# Patient Record
Sex: MALE | Race: WHITE | NOT HISPANIC OR LATINO | Employment: FULL TIME
[De-identification: names, ages, dates, MRNs, and addresses within clinical notes are randomized per-mention and may not be internally consistent; named-entity substitution may affect disease eponyms.]

---

## 2017-11-04 ENCOUNTER — CHARTING TRANS (OUTPATIENT)
Dept: OTHER | Age: 27
End: 2017-11-04

## 2018-05-27 ENCOUNTER — LAB SERVICES (OUTPATIENT)
Dept: OTHER | Age: 28
End: 2018-05-27

## 2018-05-27 ENCOUNTER — CHARTING TRANS (OUTPATIENT)
Dept: OTHER | Age: 28
End: 2018-05-27

## 2018-05-27 LAB — RAPID STREP GROUP A: POSITIVE

## 2018-11-01 VITALS
WEIGHT: 311.52 LBS | OXYGEN SATURATION: 96 % | SYSTOLIC BLOOD PRESSURE: 120 MMHG | HEART RATE: 80 BPM | RESPIRATION RATE: 20 BRPM | TEMPERATURE: 98.7 F | DIASTOLIC BLOOD PRESSURE: 70 MMHG

## 2018-11-02 VITALS — HEIGHT: 74 IN | WEIGHT: 300 LBS | HEART RATE: 74 BPM | TEMPERATURE: 98.3 F | BODY MASS INDEX: 38.5 KG/M2

## 2019-11-13 PROCEDURE — 88342 IMHCHEM/IMCYTCHM 1ST ANTB: CPT | Performed by: SPECIALIST

## 2019-11-13 PROCEDURE — 88305 TISSUE EXAM BY PATHOLOGIST: CPT | Performed by: SPECIALIST

## 2022-04-23 ENCOUNTER — WALK IN (OUTPATIENT)
Dept: URGENT CARE | Age: 32
End: 2022-04-23

## 2022-04-23 VITALS
HEART RATE: 80 BPM | TEMPERATURE: 98.4 F | SYSTOLIC BLOOD PRESSURE: 120 MMHG | OXYGEN SATURATION: 95 % | DIASTOLIC BLOOD PRESSURE: 80 MMHG | RESPIRATION RATE: 16 BRPM

## 2022-04-23 DIAGNOSIS — J02.9 PHARYNGITIS, UNSPECIFIED ETIOLOGY: ICD-10-CM

## 2022-04-23 DIAGNOSIS — B97.89 VIRAL SINUSITIS: ICD-10-CM

## 2022-04-23 DIAGNOSIS — J32.9 VIRAL SINUSITIS: ICD-10-CM

## 2022-04-23 DIAGNOSIS — H66.001 NON-RECURRENT ACUTE SUPPURATIVE OTITIS MEDIA OF RIGHT EAR WITHOUT SPONTANEOUS RUPTURE OF TYMPANIC MEMBRANE: Primary | ICD-10-CM

## 2022-04-23 LAB
INTERNAL PROCEDURAL CONTROLS ACCEPTABLE: YES
S PYO AG THROAT QL IA.RAPID: NEGATIVE
TEST LOT EXPIRATION DATE: NORMAL
TEST LOT NUMBER: NORMAL

## 2022-04-23 PROCEDURE — 87880 STREP A ASSAY W/OPTIC: CPT | Performed by: NURSE PRACTITIONER

## 2022-04-23 PROCEDURE — 99202 OFFICE O/P NEW SF 15 MIN: CPT | Performed by: NURSE PRACTITIONER

## 2022-04-23 RX ORDER — AMOXICILLIN AND CLAVULANATE POTASSIUM 875; 125 MG/1; MG/1
1 TABLET, FILM COATED ORAL EVERY 12 HOURS
Qty: 20 TABLET | Refills: 0 | Status: SHIPPED | OUTPATIENT
Start: 2022-04-23

## 2022-04-23 ASSESSMENT — ENCOUNTER SYMPTOMS
SHORTNESS OF BREATH: 1
SORE THROAT: 1
FATIGUE: 0
CHEST TIGHTNESS: 0
DIARRHEA: 0
WHEEZING: 0
SINUS PRESSURE: 1
CHILLS: 0
HEADACHES: 0
COUGH: 1
FEVER: 0
ABDOMINAL PAIN: 0
NAUSEA: 0
VOMITING: 0
RHINORRHEA: 0

## 2022-04-23 ASSESSMENT — PAIN SCALES - GENERAL: PAINLEVEL: 6

## 2023-08-01 ENCOUNTER — HOSPITAL ENCOUNTER (OUTPATIENT)
Age: 33
Discharge: HOME | End: 2023-08-01
Payer: COMMERCIAL

## 2023-08-01 VITALS
TEMPERATURE: 98 F | SYSTOLIC BLOOD PRESSURE: 135 MMHG | RESPIRATION RATE: 16 BRPM | HEART RATE: 81 BPM | OXYGEN SATURATION: 98 % | DIASTOLIC BLOOD PRESSURE: 82 MMHG

## 2023-08-01 DIAGNOSIS — H61.23 BILATERAL IMPACTED CERUMEN: Primary | ICD-10-CM

## 2023-08-01 PROCEDURE — 69209 REMOVE IMPACTED EAR WAX UNI: CPT

## 2023-08-01 PROCEDURE — 99202 OFFICE O/P NEW SF 15 MIN: CPT

## 2023-08-01 NOTE — DISCHARGE INSTRUCTIONS
I would avoid swimming, submerging head in water, tub baths, getting water in ears etc.  May take Tylenol or Motrin. Please avoid frequent use of Q-tips. Do not stick or pour anything in ear for the next 2 to 3 days.   If you feel cerumen impaction occurring again, you may return to immediate care for removal. [FreeTextEntry1] : Impression: breast exam- benign\par back pain

## 2023-08-01 NOTE — ED INITIAL ASSESSMENT (HPI)
Patient arrives ambulatory with c/o unable to hear out of both ears since the weekend. States he thinks it is wax buildup and normally goes to the ENT, but is unable to get in.

## 2024-12-31 ENCOUNTER — HOSPITAL ENCOUNTER (EMERGENCY)
Facility: HOSPITAL | Age: 34
Discharge: HOME | End: 2024-12-31
Attending: STUDENT IN AN ORGANIZED HEALTH CARE EDUCATION/TRAINING PROGRAM
Payer: COMMERCIAL

## 2024-12-31 VITALS
HEART RATE: 82 BPM | OXYGEN SATURATION: 100 % | DIASTOLIC BLOOD PRESSURE: 91 MMHG | SYSTOLIC BLOOD PRESSURE: 145 MMHG | RESPIRATION RATE: 18 BRPM | TEMPERATURE: 98 F

## 2024-12-31 DIAGNOSIS — M54.32 SCIATICA OF LEFT SIDE: Primary | ICD-10-CM

## 2024-12-31 PROCEDURE — 96372 THER/PROPH/DIAG INJ SC/IM: CPT

## 2024-12-31 PROCEDURE — 99283 EMERGENCY DEPT VISIT LOW MDM: CPT

## 2024-12-31 PROCEDURE — 99284 EMERGENCY DEPT VISIT MOD MDM: CPT

## 2024-12-31 RX ORDER — DIAZEPAM 2 MG/1
2 TABLET ORAL ONCE
Status: COMPLETED | OUTPATIENT
Start: 2024-12-31 | End: 2024-12-31

## 2024-12-31 RX ORDER — LIDOCAINE 50 MG/G
1 PATCH TOPICAL EVERY 24 HOURS
Qty: 7 PATCH | Refills: 0 | Status: SHIPPED | OUTPATIENT
Start: 2024-12-31 | End: 2025-01-10

## 2024-12-31 RX ORDER — KETOROLAC TROMETHAMINE 15 MG/ML
15 INJECTION, SOLUTION INTRAMUSCULAR; INTRAVENOUS ONCE
Status: COMPLETED | OUTPATIENT
Start: 2024-12-31 | End: 2024-12-31

## 2024-12-31 RX ORDER — KETOROLAC TROMETHAMINE 10 MG/1
10 TABLET, FILM COATED ORAL EVERY 6 HOURS PRN
Qty: 10 TABLET | Refills: 0 | Status: SHIPPED | OUTPATIENT
Start: 2024-12-31 | End: 2025-01-07 | Stop reason: CLARIF

## 2024-12-31 RX ORDER — DIAZEPAM 2 MG/1
2 TABLET ORAL 3 TIMES DAILY PRN
Qty: 20 TABLET | Refills: 0 | Status: SHIPPED | OUTPATIENT
Start: 2024-12-31 | End: 2025-01-07 | Stop reason: CLARIF

## 2024-12-31 NOTE — DISCHARGE INSTRUCTIONS
Thank you for seeking care at [facility] Emergency Department.  You have been seen and evaluated for back pain.      We discussed the results of your workup   Please read the instructions provided   If given prescriptions, take as instructed    Remember, your care process does not end after your visit today. Please follow-up with your doctor within 1-2 days for a follow-up check to ensure you are  improving, to see if you need any further evaluation/testing, or to evaluate for any alternate diagnoses.    Please return to the emergency department if you develop severe pain that is not controlled by pain medications, loss of control of your legs, if you are unable to walk because of pain or weakness, worsening numbness or weakness, loss of bowel or bladder control (dribbling of urine or having accidents you wouldn't normally have), inability to urinate, numbness of your genital or anal area, fevers, abdominal pain, change in urination, or as these could all be signs of a serious medical emergency. Call or return to the ER for any other new or worsening symptoms.

## 2024-12-31 NOTE — ED INITIAL ASSESSMENT (HPI)
Patient to ed via private vehicle co of lower back pain, hx of herniated discs, stated had cortisone shot x 12/19 but now pain worsening

## 2025-01-01 NOTE — ED PROVIDER NOTES
[facility] Emergency Department Note  Patient: Oneil Wallace Age: 34 year old Sex: male      MRN: [id number]  : 3/4/1990    Patient Seen in: [facility] Emergency Department    History     Chief Complaint   Patient presents with    Back Pain     Stated Complaint: Back pain    History obtained from: Patient    Patient is a 34-year-old male with a past medical history of chronic low back pain secondary to known herniated disc presenting today for evaluation of worsening of his chronic low back pain.  He states that he underwent cortisone injections on  but states that since then, he has been having worsening symptoms.  He describes pain in his left buttock that radiates down the left lower extremity.  Denies any associate numbness or tingling.  Denies any known falls, trauma or injury.  Denies any bladder or bowel incontinence.  States that he sees his pain specialist in 2 days.    Review of Systems:  Review of Systems  Positive for stated complaint: Back pain. Constitutional and vital signs reviewed. All other systems reviewed and negative except as noted above.    Patient History:  Past Medical History:    GERD (gastroesophageal reflux disease)    OTHER DISEASES    NONE       Past Surgical History:   Procedure Laterality Date    Other surgical history      L-knee surgery-Arthroscopic        Family History   Problem Relation Age of Onset    Other (Other) Other        Specific Social Determinants of Health:   Social History     Socioeconomic History    Marital status:    Tobacco Use    Smoking status: Every Day     Types: Cigarettes    Smokeless tobacco: Never    Tobacco comments:     1 pack a week, maybe less   Vaping Use    Vaping status: Never Used   Substance and Sexual Activity    Alcohol use: Yes     Comment: socially    Drug use: Yes     Frequency: 7.0 times per week     Types: Cannabis           PSFH elements reviewed from today and agreed except as otherwise stated in HPI.    Physical  Exam     ED Triage Vitals   BP 12/31/24 1139 (!) 145/91   Pulse 12/31/24 1138 82   Resp 12/31/24 1138 18   Temp 12/31/24 1138 98.4 °F (36.9 °C)   Temp src --    SpO2 12/31/24 1138 100 %   O2 Device 12/31/24 1138 None (Room air)       Current:BP (!) 145/91   Pulse 82   Temp 98.4 °F (36.9 °C)   Resp 18   SpO2 100%         Physical Exam  Constitutional:       Appearance: He is well-developed.   HENT:      Head: Normocephalic and atraumatic.      Right Ear: External ear normal.      Left Ear: External ear normal.      Nose: Nose normal.   Eyes:      Conjunctiva/sclera: Conjunctivae normal.      Pupils: Pupils are equal, round, and reactive to light.   Cardiovascular:      Rate and Rhythm: Normal rate and regular rhythm.      Heart sounds: Normal heart sounds.   Pulmonary:      Effort: Pulmonary effort is normal.      Breath sounds: Normal breath sounds.   Abdominal:      General: Bowel sounds are normal.      Palpations: Abdomen is soft.      Tenderness: There is no abdominal tenderness.   Musculoskeletal:         General: Normal range of motion.      Cervical back: Normal range of motion and neck supple.      Comments: No midline back tenderness.  No reproducible tenderness over the lower back or buttock region bilaterally   Skin:     General: Skin is warm and dry.      Findings: No rash.   Neurological:      General: No focal deficit present.      Mental Status: He is alert and oriented to person, place, and time.      Deep Tendon Reflexes: Reflexes are normal and symmetric.   Psychiatric:         Mood and Affect: Mood normal.         Behavior: Behavior normal.         ED Course   Labs:   Labs Reviewed - No data to display  Radiology findings:  I personally reviewed the images.   No results found.        MDM   34-year-old male with a past medical history of chronic low back pain secondary to herniated disc presenting for evaluation of left low back pain radiating to left lower extremity.  Upon arrival emergency  department, he is well-appearing and in no acute distress.  Hemodynamically stable.  No reproducible midline neck or back tenderness.  No fevers or other infectious etiologies.  No urinary symptoms.  Normal distal perfusion.  Normal active and passive range of motion.  Patient has follow-up with his pain specialist in 2 days.    Differential diagnoses considered includes, but is not limited to: Sciatica, herniated disc, chronic low back pain, meralgia paresthetica    Will obtain the following tests: None  Please see ED course for my independent review of these tests/imaging results.    Initial Medications/Therapeutics administered: Toradol, Valium, lidocaine patch    Chronic conditions affecting care: Chronic low back pain, herniated disc    Workup and medications considered but not ordered: No indication for imaging given no midline tenderness.  No new falls, trauma or injury.  No associated neurologic deficits.      Social Determinants of Health that impacted care: None     Following analgesic medications, no significant improvement of pain.  Ambulating without difficulty.  Stable for discharge home at this time with continued analgesic support until patient is able to follow-up with pain specialist in 2 days.  Return precautions were provided and all questions answered.  Patient expressed understanding and agreement with plan.      Disposition and Plan     Clinical Impression:  1. Sciatica of left side        Disposition:  Discharge    Follow-up:  Sulma Nuñez MD  [address]  [address]  [place] [address] [phone]    Schedule an appointment as soon as possible for a visit in 2 day(s)  If symptoms worsen, As needed    Jeovany Lauren,   [address]  [address]  [place] [address] [phone]    Go in 2 day(s)        Medications Prescribed:  Discharge Medication List as of 12/31/2024  3:15 PM        START taking these medications    Details   diazePAM 2 MG Oral Tab Take 1 tablet (2 mg  total) by mouth 3 (three) times daily as needed for Anxiety., Normal, Disp-20 tablet, R-0      lidocaine 5 % External Patch Place 1 patch onto the skin daily for 7 days., Normal, Disp-7 patch, R-0      Ketorolac Tromethamine 10 MG Oral Tab Take 1 tablet (10 mg total) by mouth every 6 (six) hours as needed., Normal, Disp-10 tablet, R-0               This note may have been created using voice dictation technology and may include inadvertent errors.      Kelly Ramirez MD  Emergency Medicine

## 2025-01-07 ENCOUNTER — HOSPITAL ENCOUNTER (INPATIENT)
Facility: HOSPITAL | Age: 35
LOS: 1 days | Discharge: HOME | End: 2025-01-10
Attending: EMERGENCY MEDICINE | Admitting: INTERNAL MEDICINE
Payer: COMMERCIAL

## 2025-01-07 ENCOUNTER — APPOINTMENT (OUTPATIENT)
Dept: MRI IMAGING | Facility: HOSPITAL | Age: 35
End: 2025-01-07
Attending: EMERGENCY MEDICINE
Payer: COMMERCIAL

## 2025-01-07 DIAGNOSIS — M54.16 LUMBAR RADICULOPATHY: ICD-10-CM

## 2025-01-07 DIAGNOSIS — G89.29 ACUTE ON CHRONIC LOW BACK PAIN: Primary | ICD-10-CM

## 2025-01-07 DIAGNOSIS — M54.50 ACUTE ON CHRONIC LOW BACK PAIN: Primary | ICD-10-CM

## 2025-01-07 LAB
ANION GAP SERPL CALC-SCNC: 6 MMOL/L (ref 0–18)
BASOPHILS # BLD AUTO: 0.05 X10(3) UL (ref 0–0.2)
BASOPHILS NFR BLD AUTO: 0.7 %
BILIRUB UR QL: NEGATIVE
BUN BLD-MCNC: 15 MG/DL (ref 9–23)
BUN/CREAT SERPL: 16.3 (ref 10–20)
CALCIUM BLD-MCNC: 10.2 MG/DL (ref 8.7–10.4)
CHLORIDE SERPL-SCNC: 105 MMOL/L (ref 98–112)
CK SERPL-CCNC: 156 U/L
CLARITY UR: CLEAR
CO2 SERPL-SCNC: 26 MMOL/L (ref 21–32)
CREAT BLD-MCNC: 0.92 MG/DL
DEPRECATED RDW RBC AUTO: 38.6 FL (ref 35.1–46.3)
EGFRCR SERPLBLD CKD-EPI 2021: 111 ML/MIN/1.73M2 (ref 60–?)
EOSINOPHIL # BLD AUTO: 0.01 X10(3) UL (ref 0–0.7)
EOSINOPHIL NFR BLD AUTO: 0.1 %
ERYTHROCYTE [DISTWIDTH] IN BLOOD BY AUTOMATED COUNT: 12 % (ref 11–15)
GLUCOSE BLD-MCNC: 108 MG/DL (ref 70–99)
GLUCOSE UR-MCNC: NORMAL MG/DL
HCT VFR BLD AUTO: 46.8 %
HGB BLD-MCNC: 16.9 G/DL
HGB UR QL STRIP.AUTO: NEGATIVE
IMM GRANULOCYTES # BLD AUTO: 0.05 X10(3) UL (ref 0–1)
IMM GRANULOCYTES NFR BLD: 0.7 %
KETONES UR-MCNC: NEGATIVE MG/DL
LEUKOCYTE ESTERASE UR QL STRIP.AUTO: NEGATIVE
LYMPHOCYTES # BLD AUTO: 1.59 X10(3) UL (ref 1–4)
LYMPHOCYTES NFR BLD AUTO: 20.8 %
MCH RBC QN AUTO: 31.5 PG (ref 26–34)
MCHC RBC AUTO-ENTMCNC: 36.1 G/DL (ref 31–37)
MCV RBC AUTO: 87.2 FL
MONOCYTES # BLD AUTO: 0.41 X10(3) UL (ref 0.1–1)
MONOCYTES NFR BLD AUTO: 5.4 %
NEUTROPHILS # BLD AUTO: 5.55 X10 (3) UL (ref 1.5–7.7)
NEUTROPHILS # BLD AUTO: 5.55 X10(3) UL (ref 1.5–7.7)
NEUTROPHILS NFR BLD AUTO: 72.3 %
NITRITE UR QL STRIP.AUTO: NEGATIVE
OSMOLALITY SERPL CALC.SUM OF ELEC: 285 MOSM/KG (ref 275–295)
PH UR: 7.5 [PH] (ref 5–8)
PLATELET # BLD AUTO: 274 10(3)UL (ref 150–450)
POTASSIUM SERPL-SCNC: 4.3 MMOL/L (ref 3.5–5.1)
PROT UR-MCNC: NEGATIVE MG/DL
RBC # BLD AUTO: 5.37 X10(6)UL
SODIUM SERPL-SCNC: 137 MMOL/L (ref 136–145)
SP GR UR STRIP: 1.01 (ref 1–1.03)
UROBILINOGEN UR STRIP-ACNC: NORMAL
WBC # BLD AUTO: 7.7 X10(3) UL (ref 4–11)

## 2025-01-07 PROCEDURE — 96372 THER/PROPH/DIAG INJ SC/IM: CPT

## 2025-01-07 PROCEDURE — 72148 MRI LUMBAR SPINE W/O DYE: CPT | Performed by: EMERGENCY MEDICINE

## 2025-01-07 PROCEDURE — 82550 ASSAY OF CK (CPK): CPT | Performed by: EMERGENCY MEDICINE

## 2025-01-07 PROCEDURE — 81003 URINALYSIS AUTO W/O SCOPE: CPT | Performed by: EMERGENCY MEDICINE

## 2025-01-07 PROCEDURE — 36415 COLL VENOUS BLD VENIPUNCTURE: CPT

## 2025-01-07 PROCEDURE — 85025 COMPLETE CBC W/AUTO DIFF WBC: CPT | Performed by: EMERGENCY MEDICINE

## 2025-01-07 PROCEDURE — 99285 EMERGENCY DEPT VISIT HI MDM: CPT

## 2025-01-07 PROCEDURE — 80048 BASIC METABOLIC PNL TOTAL CA: CPT | Performed by: EMERGENCY MEDICINE

## 2025-01-07 RX ORDER — MORPHINE SULFATE 4 MG/ML
4 INJECTION, SOLUTION INTRAMUSCULAR; INTRAVENOUS EVERY 2 HOUR PRN
Status: DISCONTINUED | OUTPATIENT
Start: 2025-01-07 | End: 2025-01-10

## 2025-01-07 RX ORDER — KETOROLAC TROMETHAMINE 30 MG/ML
60 INJECTION, SOLUTION INTRAMUSCULAR; INTRAVENOUS ONCE
Status: COMPLETED | OUTPATIENT
Start: 2025-01-07 | End: 2025-01-07

## 2025-01-07 RX ORDER — CYCLOBENZAPRINE HCL 10 MG
10 TABLET ORAL 3 TIMES DAILY PRN
Status: DISCONTINUED | OUTPATIENT
Start: 2025-01-07 | End: 2025-01-10

## 2025-01-07 RX ORDER — BUPROPION HYDROCHLORIDE 150 MG/1
150 TABLET, EXTENDED RELEASE ORAL 2 TIMES DAILY
Status: DISCONTINUED | OUTPATIENT
Start: 2025-01-07 | End: 2025-01-10

## 2025-01-07 RX ORDER — MORPHINE SULFATE 2 MG/ML
1 INJECTION, SOLUTION INTRAMUSCULAR; INTRAVENOUS EVERY 2 HOUR PRN
Status: DISCONTINUED | OUTPATIENT
Start: 2025-01-07 | End: 2025-01-10

## 2025-01-07 RX ORDER — BISACODYL 10 MG
10 SUPPOSITORY, RECTAL RECTAL
Status: DISCONTINUED | OUTPATIENT
Start: 2025-01-07 | End: 2025-01-10

## 2025-01-07 RX ORDER — POLYETHYLENE GLYCOL 3350 17 G/17G
17 POWDER, FOR SOLUTION ORAL DAILY PRN
Status: DISCONTINUED | OUTPATIENT
Start: 2025-01-07 | End: 2025-01-10

## 2025-01-07 RX ORDER — PREDNISONE 20 MG/1
20 TABLET ORAL AS DIRECTED
COMMUNITY
Start: 2025-01-02 | End: 2025-01-10

## 2025-01-07 RX ORDER — ACETAMINOPHEN 325 MG/1
650 TABLET ORAL EVERY 4 HOURS PRN
Status: DISCONTINUED | OUTPATIENT
Start: 2025-01-07 | End: 2025-01-10

## 2025-01-07 RX ORDER — HYDROCODONE BITARTRATE AND ACETAMINOPHEN 10; 325 MG/1; MG/1
1 TABLET ORAL EVERY 4 HOURS PRN
Status: DISCONTINUED | OUTPATIENT
Start: 2025-01-07 | End: 2025-01-07

## 2025-01-07 RX ORDER — BUPROPION HYDROCHLORIDE 150 MG/1
150 TABLET, EXTENDED RELEASE ORAL 2 TIMES DAILY
COMMUNITY
Start: 2024-09-09

## 2025-01-07 RX ORDER — HYDROCODONE BITARTRATE AND ACETAMINOPHEN 5; 325 MG/1; MG/1
2 TABLET ORAL EVERY 6 HOURS PRN
COMMUNITY
Start: 2025-01-02 | End: 2025-01-10

## 2025-01-07 RX ORDER — HYDROCODONE BITARTRATE AND ACETAMINOPHEN 10; 325 MG/1; MG/1
1 TABLET ORAL ONCE
Status: COMPLETED | OUTPATIENT
Start: 2025-01-07 | End: 2025-01-07

## 2025-01-07 RX ORDER — GABAPENTIN 100 MG/1
100 CAPSULE ORAL 3 TIMES DAILY
Status: DISCONTINUED | OUTPATIENT
Start: 2025-01-07 | End: 2025-01-07

## 2025-01-07 RX ORDER — HYDROCODONE BITARTRATE AND ACETAMINOPHEN 5; 325 MG/1; MG/1
1 TABLET ORAL EVERY 4 HOURS PRN
Status: DISCONTINUED | OUTPATIENT
Start: 2025-01-07 | End: 2025-01-10

## 2025-01-07 RX ORDER — PROCHLORPERAZINE EDISYLATE 5 MG/ML
5 INJECTION INTRAMUSCULAR; INTRAVENOUS EVERY 8 HOURS PRN
Status: DISCONTINUED | OUTPATIENT
Start: 2025-01-07 | End: 2025-01-10

## 2025-01-07 RX ORDER — MORPHINE SULFATE 2 MG/ML
2 INJECTION, SOLUTION INTRAMUSCULAR; INTRAVENOUS EVERY 2 HOUR PRN
Status: DISCONTINUED | OUTPATIENT
Start: 2025-01-07 | End: 2025-01-10

## 2025-01-07 RX ORDER — HYDROCODONE BITARTRATE AND ACETAMINOPHEN 5; 325 MG/1; MG/1
2 TABLET ORAL EVERY 4 HOURS PRN
Status: DISCONTINUED | OUTPATIENT
Start: 2025-01-07 | End: 2025-01-10

## 2025-01-07 RX ORDER — CYCLOBENZAPRINE HCL 10 MG
10 TABLET ORAL
Status: ON HOLD | COMMUNITY
End: 2025-01-10

## 2025-01-07 RX ORDER — DEXAMETHASONE SODIUM PHOSPHATE 10 MG/ML
10 INJECTION, SOLUTION INTRAMUSCULAR; INTRAVENOUS ONCE
Status: COMPLETED | OUTPATIENT
Start: 2025-01-07 | End: 2025-01-07

## 2025-01-07 RX ORDER — ONDANSETRON 2 MG/ML
4 INJECTION INTRAMUSCULAR; INTRAVENOUS EVERY 6 HOURS PRN
Status: DISCONTINUED | OUTPATIENT
Start: 2025-01-07 | End: 2025-01-10

## 2025-01-07 RX ORDER — GABAPENTIN 300 MG/1
300 CAPSULE ORAL 3 TIMES DAILY
COMMUNITY
Start: 2024-12-30 | End: 2025-01-29

## 2025-01-07 RX ORDER — GABAPENTIN 300 MG/1
300 CAPSULE ORAL 3 TIMES DAILY
Status: DISCONTINUED | OUTPATIENT
Start: 2025-01-07 | End: 2025-01-08

## 2025-01-07 RX ORDER — SODIUM PHOSPHATE, DIBASIC AND SODIUM PHOSPHATE, MONOBASIC 7; 19 G/230ML; G/230ML
1 ENEMA RECTAL ONCE AS NEEDED
Status: DISCONTINUED | OUTPATIENT
Start: 2025-01-07 | End: 2025-01-10

## 2025-01-07 RX ORDER — SENNOSIDES 8.6 MG
17.2 TABLET ORAL NIGHTLY PRN
Status: DISCONTINUED | OUTPATIENT
Start: 2025-01-07 | End: 2025-01-10

## 2025-01-07 RX ORDER — ORPHENADRINE CITRATE 30 MG/ML
60 INJECTION INTRAMUSCULAR; INTRAVENOUS ONCE
Status: COMPLETED | OUTPATIENT
Start: 2025-01-07 | End: 2025-01-07

## 2025-01-07 NOTE — ED QUICK NOTES
Orders for admission, patient is aware of plan and ready to go upstairs. Any questions, please call ED RN JURATE at extension [phone].     Patient Covid vaccination status: Fully vaccinated     COVID Test Ordered in ED: None    COVID Suspicion at Admission: N/A    Running Infusions:  None    Mental Status/LOC at time of transport: AXOX4    Other pertinent information:   CIWA score: N/A   NIH score:  N/A

## 2025-01-07 NOTE — PLAN OF CARE
Monitoring vital signs- stable at this time. No acute changes noted throughout shift. Tolerating diet. Voiding . SCDs for DVT prophylaxis. Pain medication provided as needed. Encouraged frequent ambulation and use of incentive spirometer. Fall precautions maintained- bed alarm on, bed locked in lowest position, call light and personal belongings within reach, non-skid socks in place to bilateral feet. Frequent rounding by nursing staff. Plan to discharge home once medically cleared.      Problem: Patient Centered Care  Goal: Patient preferences are identified and integrated in the patient's plan of care  Description: Interventions:  - What would you like us to know as we care for you?   - Provide timely, complete, and accurate information to patient/family  - Incorporate patient and family knowledge, values, beliefs, and cultural backgrounds into the planning and delivery of care  - Encourage patient/family to participate in care and decision-making at the level they choose  - Honor patient and family perspectives and choices  Outcome: Progressing     Problem: Patient/Family Goals  Goal: Patient/Family Long Term Goal  Description: Patient's Long Term Goal:     Interventions:  - See additional Care Plan goals for specific interventions  Outcome: Progressing  Goal: Patient/Family Short Term Goal  Description: Patient's Short Term Goal:     Interventions:   - See additional Care Plan goals for specific interventions  Outcome: Progressing     Problem: PAIN - ADULT  Goal: Verbalizes/displays adequate comfort level or patient's stated pain goal  Description: INTERVENTIONS:  - Encourage pt to monitor pain and request assistance  - Assess pain using appropriate pain scale  - Administer analgesics based on type and severity of pain and evaluate response  - Implement non-pharmacological measures as appropriate and evaluate response  - Consider cultural and social influences on pain and pain management  - Manage/alleviate  anxiety  - Utilize distraction and/or relaxation techniques  - Monitor for opioid side effects  - Notify MD/LIP if interventions unsuccessful or patient reports new pain  - Anticipate increased pain with activity and pre-medicate as appropriate  Outcome: Progressing     Problem: RISK FOR INFECTION - ADULT  Goal: Absence of fever/infection during anticipated neutropenic period  Description: INTERVENTIONS  - Monitor WBC  - Administer growth factors as ordered  - Implement neutropenic guidelines  Outcome: Progressing     Problem: SAFETY ADULT - FALL  Goal: Free from fall injury  Description: INTERVENTIONS:  - Assess pt frequently for physical needs  - Identify cognitive and physical deficits and behaviors that affect risk of falls.  - [place] fall precautions as indicated by assessment.  - Educate pt/family on patient safety including physical limitations  - Instruct pt to call for assistance with activity based on assessment  - Modify environment to reduce risk of injury  - Provide assistive devices as appropriate  - Consider OT/PT consult to assist with strengthening/mobility  - Encourage toileting schedule  Outcome: Progressing     Problem: DISCHARGE PLANNING  Goal: Discharge to home or other facility with appropriate resources  Description: INTERVENTIONS:  - Identify barriers to discharge w/pt and caregiver  - Include patient/family/discharge partner in discharge planning  - Arrange for needed discharge resources and transportation as appropriate  - Identify discharge learning needs (meds, wound care, etc)  - Arrange for interpreters to assist at discharge as needed  - Consider post-discharge preferences of patient/family/discharge partner  - Complete POLST form as appropriate  - Assess patient's ability to be responsible for managing their own health  - Refer to Case Management Department for coordinating discharge planning if the patient needs post-hospital services based on physician/LIP order or complex needs  related to functional status, cognitive ability or social support system  Outcome: Progressing

## 2025-01-07 NOTE — ED INITIAL ASSESSMENT (HPI)
Pt presents to the ED via EMS for worsening lower left back pain that radiates to his left leg for two days. History of a herniated discs at L4-L5,S1. Pt reports laying on the ground for an entire week. Denies incontinence.

## 2025-01-07 NOTE — ED PROVIDER NOTES
Patient Seen in: [facility] Emergency Department    History     Chief Complaint   Patient presents with    Back Pain     Stated Complaint:  back pain    HPI    34-year-old male past medical history of lumbar pain for past several months now status post LESI December 19 presenting with ongoing/worsening pain despite flexeril/prednisone taper/norco.  No trauma or overexertion.  No focal weakness or incontinence, saddle anesthesia.  No anticoagulant antiplatelets.  No fevers or chills.  No urinary complaints.  Symptoms with radiation into left lower extremity and with further descriptions of left cramping/\"charley horse\" to lower extremity without preceding vomiting/diarrhea.    Past Medical History:    GERD (gastroesophageal reflux disease)    OTHER DISEASES    NONE       Past Surgical History:   Procedure Laterality Date    Other surgical history      L-knee surgery-Arthroscopic            Family History   Problem Relation Age of Onset    Other (Other) Other        Social History     Socioeconomic History    Marital status:    Tobacco Use    Smoking status: Every Day     Types: Cigarettes    Smokeless tobacco: Never    Tobacco comments:     1 pack a week, maybe less   Vaping Use    Vaping status: Never Used   Substance and Sexual Activity    Alcohol use: Yes     Comment: socially    Drug use: Yes     Frequency: 7.0 times per week     Types: Cannabis       Review of Systems :  Constitutional: As per HPI  Musculoskeletal: Negative for joint swelling and arthralgias.  Positive for back pain.  Skin: Negative for rash. No itching.      Positive for stated complaint:   Other systems are as noted in HPI.  Constitutional and vital signs reviewed.      All other systems reviewed and negative except as noted above.    PSFH elements reviewed from today and agreed except as otherwise stated in HPI.    Physical Exam     ED Triage Vitals [01/07/25 1150]   BP (!) 143/100   Pulse 94   Resp 18   Temp 98.2 °F (36.8 °C)    Temp src Rectal   SpO2 95 %   O2 Device None (Room air)       Current:BP (!) 143/100   Pulse 94   Temp 98.2 °F (36.8 °C) (Rectal)   Resp 18   Ht 185.4 cm (6' 1\")   Wt (!) 142.9 kg   SpO2 95%   BMI 41.56 kg/m²         Physical Exam   Constitutional: Nontoxic, uncomfortable appearing.  HEENT: MMM.  Head: Normocephalic.   Eyes: No injection.   Cardiovascular: RRR.  Lower extremities with 2+ DP/PT pulses.  Pulmonary/Chest: Effort normal. CTAB.  Abdominal: Soft.  Nontender.  Musculoskeletal: No gross deformity.  Paralumbar spasm without cutaneous crepitance changing with soft compartments without midline step-off/deformity.  Neurological: Alert.  Lower extremities with 5/5 strength proximally distally with 2+ patellar DTRs and intact EHL.  Skin: Skin is warm.   Psychiatric: Cooperative.  Nursing note and vitals reviewed.        ED Course     Labs Reviewed   BASIC METABOLIC PANEL (8) - Abnormal; Notable for the following components:       Result Value    Glucose 108 (*)     All other components within normal limits   CK CREATINE KINASE (NOT CREATININE) - Normal   URINALYSIS, ROUTINE   CBC WITH DIFFERENTIAL WITH PLATELET       ED Course as of 01/07/25 2122  ------------------------------------------------------------  Time: 01/07 1409  Comment: Pain improving though ongoing particularly with movement - will admit for ongoing management including specialist evaluation with repeat MRI pending at time of admission.       MDM   DIFFERENTIAL DIAGNOSIS: After history and physical exam differential diagnosis includes but is not limited to lumbar radiculopathy, rhabdomyolysis, electrolyte derangement.    Pulse ox: 95%:Normal on RA, as independently interpreted by myself    Medical Decision Making  Evaluation for acute on chronic lumbar pain with radiation into left lower extremity consistent with radiculopathy without interval/preceding trauma or constitutional complaints without neurovascular compromise; pain improved  though ongoing after parenteral medications, admit for ongoing management including specialty evaluation and repeat imaging with MRI pending at time of admission.  Case discussed with [facility] hospitalist Dr. Cheatham for admission with physiatry/spine Dr. Lauren notified of consultation and recommendations pending.    Problems Addressed:  Acute on chronic low back pain: chronic illness or injury with exacerbation, progression, or side effects of treatment  Lumbar radiculopathy: chronic illness or injury with exacerbation, progression, or side effects of treatment    Amount and/or Complexity of Data Reviewed  External Data Reviewed: notes.     Details: Physiatry note from 1/2/2025 reviewed  Labs: ordered. Decision-making details documented in ED Course.  Radiology: ordered.  Discussion of management or test interpretation with external provider(s): Case d/w [facility] hospitalist Dr. Cheatham for admission with physiatry Dr. Luaren notified of consultation    Risk  Prescription drug management.  Decision regarding hospitalization.      I was wearing at minimum a facemask and eye protection throughout this encounter with handwashing performed prior and after patient evaluation without personal hand/facial/oropharyngeal contact and gloves worn throughout encounter. See note and/or contact this provider for further PPE details.    Disposition and Plan     Clinical Impression:  1. Acute on chronic low back pain    2. Lumbar radiculopathy        Disposition:  Admit    Follow-up:  No follow-up provider specified.    Medications Prescribed:  Current Discharge Medication List

## 2025-01-07 NOTE — H&P
DM Hospitalist H&P       CC:   Chief Complaint   Patient presents with    Back Pain        PCP: Sulma Nuñez MD    History of Present Illness: Patient is a 34 year old male with PMH sig for MO, GERD, Herniated discs at L4-L5,S1 s/p LESI 12/19/24 who presents with back pain and LLE radiculopathy.  Denies any loss of bowel or bladder control, no saddle anesthesia.  Denies any chest pain or shortness of breath, abdominal pain, nausea or vomiting, fever or chills.    In the ED he is afebrile, hypertensive, on RA  Labs unremarkable   UA bland     PMH  Past Medical History:    GERD (gastroesophageal reflux disease)    OTHER DISEASES    NONE      PSH  Past Surgical History:   Procedure Laterality Date    Other surgical history      L-knee surgery-Arthroscopic      ALL:  Allergies[1]     Home Medications:  Medications Taking[2]      Soc Hx  Social History     Tobacco Use    Smoking status: Every Day     Types: Cigarettes    Smokeless tobacco: Never    Tobacco comments:     1 pack a week, maybe less   Substance Use Topics    Alcohol use: Yes     Comment: socially        Fam Hx  Family History   Problem Relation Age of Onset    Other (Other) Other        Review of Systems  Comprehensive ROS reviewed and negative except for what's stated above.     OBJECTIVE:  BP (!) 136/94   Pulse 100   Temp 98.2 °F (36.8 °C) (Rectal)   Resp 18   Ht 6' 1\" (1.854 m)   Wt (!) 315 lb (142.9 kg)   SpO2 98%   BMI 41.56 kg/m²   General: Alert, no acute distress  HEENT: oral mucosa normal   Neck: non tender, no adenopathy   Lungs: clear to ausculation bilaterally  Heart: Regular rate and rhythm  Abdomen: soft, non tender, non distended   Extremities: No edema  Skin: no new rash, normal color  Neuro: decreased ROM left leg, normal sensations  Psych: appropriate affect   Diagnostic Data:    CBC/Chem  No results for input(s): \"WBC\", \"HGB\", \"MCV\", \"PLT\", \"BAND\", \"INR\" in the last 168 hours.    Invalid input(s): \"LYM#\", \"MONO#\", \"BASOS#\",  \"EOSIN#\"    No results for input(s): \"NA\", \"K\", \"CL\", \"CO2\", \"BUN\", \"CREATSERUM\", \"GLU\", \"CA\", \"CAION\", \"MG\", \"PHOS\" in the last 168 hours.    No results for input(s): \"ALT\", \"AST\", \"ALB\", \"AMYLASE\", \"LIPASE\", \"LDH\" in the last 168 hours.    Invalid input(s): \"ALPHOS\", \"TBIL\", \"DBIL\", \"TPROT\"    No results for input(s): \"TROP\" in the last 168 hours.    Radiology: No results found.      ASSESSMENT / PLAN:   Patient is a 34 year old male with PMH sig for MO, GERD, Herniated discs at L4-L5,S1 s/p LESI 12/19/24 who presents with back pain and LLE radiculopathy.     Back Pain   H/o Herniated discs at L4-L5,S1   - s/p LESI 12/19/24   - now with uncontrolled back pain and LLE radiculopathy  - ortho spine on consult   - pain control   - lidocaine patch, flexeril, gabapentin, tylenol, norco, morphine started   - PT/OT  - f/u MRI     GERD - PPI prn    SAD - Wellbutrin     FN:  - IVF: as needed   - Diet: general     DVT Prophy: SCDs (hold chemical pxx incase intervention is needed)   Atrophy: Ambulate, PT/OT  Lines: Piv    Dispo: pending clinical course    Outpatient records or previous hospital records reviewed.     Further recommendations pending patient's clinical course.  [facility] hospitalist to continue to follow patient while in house    Patient and/or patient's family given opportunity to ask questions and note understanding and agreeing with therapeutic plan as outlined    Thank You,  Jose Raul Cheatham MD    [facility] Hospitalist  Answering Service number: [phone]         [1] No Known Allergies  [2]   No outpatient medications have been marked as taking for the 1/7/25 encounter (Hospital Encounter).

## 2025-01-08 LAB
ANION GAP SERPL CALC-SCNC: 9 MMOL/L (ref 0–18)
BASOPHILS # BLD AUTO: 0.02 X10(3) UL (ref 0–0.2)
BASOPHILS NFR BLD AUTO: 0.2 %
BUN BLD-MCNC: 18 MG/DL (ref 9–23)
BUN/CREAT SERPL: 18.2 (ref 10–20)
CALCIUM BLD-MCNC: 10.1 MG/DL (ref 8.7–10.4)
CHLORIDE SERPL-SCNC: 103 MMOL/L (ref 98–112)
CO2 SERPL-SCNC: 29 MMOL/L (ref 21–32)
CREAT BLD-MCNC: 0.99 MG/DL
DEPRECATED RDW RBC AUTO: 39.7 FL (ref 35.1–46.3)
EGFRCR SERPLBLD CKD-EPI 2021: 102 ML/MIN/1.73M2 (ref 60–?)
EOSINOPHIL # BLD AUTO: 0.01 X10(3) UL (ref 0–0.7)
EOSINOPHIL NFR BLD AUTO: 0.1 %
ERYTHROCYTE [DISTWIDTH] IN BLOOD BY AUTOMATED COUNT: 12 % (ref 11–15)
GLUCOSE BLD-MCNC: 124 MG/DL (ref 70–99)
HCT VFR BLD AUTO: 47.3 %
HGB BLD-MCNC: 16.7 G/DL
IMM GRANULOCYTES # BLD AUTO: 0.07 X10(3) UL (ref 0–1)
IMM GRANULOCYTES NFR BLD: 0.6 %
LYMPHOCYTES # BLD AUTO: 2.07 X10(3) UL (ref 1–4)
LYMPHOCYTES NFR BLD AUTO: 18.6 %
MAGNESIUM SERPL-MCNC: 2.5 MG/DL (ref 1.6–2.6)
MCH RBC QN AUTO: 31.2 PG (ref 26–34)
MCHC RBC AUTO-ENTMCNC: 35.3 G/DL (ref 31–37)
MCV RBC AUTO: 88.4 FL
MONOCYTES # BLD AUTO: 0.77 X10(3) UL (ref 0.1–1)
MONOCYTES NFR BLD AUTO: 6.9 %
NEUTROPHILS # BLD AUTO: 8.2 X10 (3) UL (ref 1.5–7.7)
NEUTROPHILS # BLD AUTO: 8.2 X10(3) UL (ref 1.5–7.7)
NEUTROPHILS NFR BLD AUTO: 73.6 %
OSMOLALITY SERPL CALC.SUM OF ELEC: 295 MOSM/KG (ref 275–295)
PLATELET # BLD AUTO: 292 10(3)UL (ref 150–450)
POTASSIUM SERPL-SCNC: 4.5 MMOL/L (ref 3.5–5.1)
RBC # BLD AUTO: 5.35 X10(6)UL
SODIUM SERPL-SCNC: 141 MMOL/L (ref 136–145)
WBC # BLD AUTO: 11.1 X10(3) UL (ref 4–11)

## 2025-01-08 PROCEDURE — 97530 THERAPEUTIC ACTIVITIES: CPT

## 2025-01-08 PROCEDURE — 85025 COMPLETE CBC W/AUTO DIFF WBC: CPT | Performed by: INTERNAL MEDICINE

## 2025-01-08 PROCEDURE — 97161 PT EVAL LOW COMPLEX 20 MIN: CPT

## 2025-01-08 PROCEDURE — 83735 ASSAY OF MAGNESIUM: CPT | Performed by: INTERNAL MEDICINE

## 2025-01-08 PROCEDURE — 80048 BASIC METABOLIC PNL TOTAL CA: CPT | Performed by: INTERNAL MEDICINE

## 2025-01-08 PROCEDURE — 97165 OT EVAL LOW COMPLEX 30 MIN: CPT

## 2025-01-08 RX ORDER — GABAPENTIN 300 MG/1
600 CAPSULE ORAL 3 TIMES DAILY
Status: DISCONTINUED | OUTPATIENT
Start: 2025-01-08 | End: 2025-01-10

## 2025-01-08 NOTE — PLAN OF CARE
Candelario is A&Ox4. RA. VSS. Tolerating diet, denies nausea. Voiding to the restroom. Saline locked. Up by self. Pain managed with prn flexeril and norco. MRI completed overnight. Bed locked and lowered. Call light within reach. Plan for PT/OT to eval/ spine/neuro consult today.     Problem: Patient Centered Care  Goal: Patient preferences are identified and integrated in the patient's plan of care  Description: Interventions:  - What would you like us to know as we care for you?   - Provide timely, complete, and accurate information to patient/family  - Incorporate patient and family knowledge, values, beliefs, and cultural backgrounds into the planning and delivery of care  - Encourage patient/family to participate in care and decision-making at the level they choose  - Honor patient and family perspectives and choices  Outcome: Progressing     Problem: Patient/Family Goals  Goal: Patient/Family Long Term Goal  Description: Patient's Long Term Goal:     Interventions:  -   - See additional Care Plan goals for specific interventions  Outcome: Progressing  Goal: Patient/Family Short Term Goal  Description: Patient's Short Term Goal:     Interventions:   -   - See additional Care Plan goals for specific interventions  Outcome: Progressing     Problem: PAIN - ADULT  Goal: Verbalizes/displays adequate comfort level or patient's stated pain goal  Description: INTERVENTIONS:  - Encourage pt to monitor pain and request assistance  - Assess pain using appropriate pain scale  - Administer analgesics based on type and severity of pain and evaluate response  - Implement non-pharmacological measures as appropriate and evaluate response  - Consider cultural and social influences on pain and pain management  - Manage/alleviate anxiety  - Utilize distraction and/or relaxation techniques  - Monitor for opioid side effects  - Notify MD/LIP if interventions unsuccessful or patient reports new pain  - Anticipate increased pain with  activity and pre-medicate as appropriate  Outcome: Progressing     Problem: RISK FOR INFECTION - ADULT  Goal: Absence of fever/infection during anticipated neutropenic period  Description: INTERVENTIONS  - Monitor WBC  - Administer growth factors as ordered  - Implement neutropenic guidelines  Outcome: Progressing     Problem: SAFETY ADULT - FALL  Goal: Free from fall injury  Description: INTERVENTIONS:  - Assess pt frequently for physical needs  - Identify cognitive and physical deficits and behaviors that affect risk of falls.  - [place] fall precautions as indicated by assessment.  - Educate pt/family on patient safety including physical limitations  - Instruct pt to call for assistance with activity based on assessment  - Modify environment to reduce risk of injury  - Provide assistive devices as appropriate  - Consider OT/PT consult to assist with strengthening/mobility  - Encourage toileting schedule  Outcome: Progressing     Problem: DISCHARGE PLANNING  Goal: Discharge to home or other facility with appropriate resources  Description: INTERVENTIONS:  - Identify barriers to discharge w/pt and caregiver  - Include patient/family/discharge partner in discharge planning  - Arrange for needed discharge resources and transportation as appropriate  - Identify discharge learning needs (meds, wound care, etc)  - Arrange for interpreters to assist at discharge as needed  - Consider post-discharge preferences of patient/family/discharge partner  - Complete POLST form as appropriate  - Assess patient's ability to be responsible for managing their own health  - Refer to Case Management Department for coordinating discharge planning if the patient needs post-hospital services based on physician/LIP order or complex needs related to functional status, cognitive ability or social support system  Outcome: Progressing

## 2025-01-08 NOTE — PLAN OF CARE
Monitoring vital signs- stable at this time. No acute changes noted throughout shift. Tolerating diet. Voiding up to bathroom. SCDs for DVT prophylaxis. Pain medication provided as needed. Encouraged frequent ambulation and use of incentive spirometer. Fall precautions maintained- bed alarm on, bed locked in lowest position, call light and personal belongings within reach, non-skid socks in place to bilateral feet. Frequent rounding by nursing staff. Plan to discharge home once medically cleared.      Problem: Patient Centered Care  Goal: Patient preferences are identified and integrated in the patient's plan of care  Description: Interventions:  - What would you like us to know as we care for you?   - Provide timely, complete, and accurate information to patient/family  - Incorporate patient and family knowledge, values, beliefs, and cultural backgrounds into the planning and delivery of care  - Encourage patient/family to participate in care and decision-making at the level they choose  - Honor patient and family perspectives and choices  Outcome: Progressing     Problem: Patient/Family Goals  Goal: Patient/Family Long Term Goal  Description: Patient's Long Term Goal:     Interventions:  - See additional Care Plan goals for specific interventions  Outcome: Progressing  Goal: Patient/Family Short Term Goal  Description: Patient's Short Term Goal:     Interventions:   - See additional Care Plan goals for specific interventions  Outcome: Progressing     Problem: PAIN - ADULT  Goal: Verbalizes/displays adequate comfort level or patient's stated pain goal  Description: INTERVENTIONS:  - Encourage pt to monitor pain and request assistance  - Assess pain using appropriate pain scale  - Administer analgesics based on type and severity of pain and evaluate response  - Implement non-pharmacological measures as appropriate and evaluate response  - Consider cultural and social influences on pain and pain management  -  Manage/alleviate anxiety  - Utilize distraction and/or relaxation techniques  - Monitor for opioid side effects  - Notify MD/LIP if interventions unsuccessful or patient reports new pain  - Anticipate increased pain with activity and pre-medicate as appropriate  Outcome: Progressing     Problem: RISK FOR INFECTION - ADULT  Goal: Absence of fever/infection during anticipated neutropenic period  Description: INTERVENTIONS  - Monitor WBC  - Administer growth factors as ordered  - Implement neutropenic guidelines  Outcome: Progressing     Problem: SAFETY ADULT - FALL  Goal: Free from fall injury  Description: INTERVENTIONS:  - Assess pt frequently for physical needs  - Identify cognitive and physical deficits and behaviors that affect risk of falls.  - [place] fall precautions as indicated by assessment.  - Educate pt/family on patient safety including physical limitations  - Instruct pt to call for assistance with activity based on assessment  - Modify environment to reduce risk of injury  - Provide assistive devices as appropriate  - Consider OT/PT consult to assist with strengthening/mobility  - Encourage toileting schedule  Outcome: Progressing     Problem: DISCHARGE PLANNING  Goal: Discharge to home or other facility with appropriate resources  Description: INTERVENTIONS:  - Identify barriers to discharge w/pt and caregiver  - Include patient/family/discharge partner in discharge planning  - Arrange for needed discharge resources and transportation as appropriate  - Identify discharge learning needs (meds, wound care, etc)  - Arrange for interpreters to assist at discharge as needed  - Consider post-discharge preferences of patient/family/discharge partner  - Complete POLST form as appropriate  - Assess patient's ability to be responsible for managing their own health  - Refer to Case Management Department for coordinating discharge planning if the patient needs post-hospital services based on physician/LIP order  or complex needs related to functional status, cognitive ability or social support system  Outcome: Progressing

## 2025-01-08 NOTE — OCCUPATIONAL THERAPY NOTE
OCCUPATIONAL THERAPY EVALUATION - INPATIENT     Room Number: 430/430-A  Evaluation Date: 1/8/2025  Type of Evaluation: Initial  Presenting Problem: acute on chronic low back pain  Hx LESI 12/19/24, herniated discs L4-5/S1     Physician Order: IP Consult to Occupational Therapy  Reason for Therapy: ADL/IADL Dysfunction and Discharge Planning    OCCUPATIONAL THERAPY ASSESSMENT   Patient is a 34 year old male admitted 1/7/2025 for acute on chronic low back pain.  Prior to admission, patient's baseline is independent with I/ADLs, including driving, and fx mobility/transfers without a device. Requiring use of RW and assist with ADLs since last Monday d/t back pain. Patient is currently functioning below baseline with ADLs and fx mobility/transfers.  Patient is requiring up to mod assist for ADLs as a result of the following impairments: decreased functional strength, decreased functional reach, decreased endurance, pain, impaired balance, and decreased muscular endurance. Occupational Therapy will continue to follow for duration of hospitalization.    Patient will benefit from continued skilled OT Services with no additional skilled services but increased support at home.    PLAN DURING HOSPITALIZATION     OT Treatment Plan: Balance activities;Energy conservation/work simplification techniques;ADL training;Functional transfer training;Endurance training;Patient/Family education;Patient/Family training;Equipment eval/education;Compensatory technique education     OCCUPATIONAL THERAPY MEDICAL/SOCIAL HISTORY   Problem List   Principal Problem:    Acute on chronic low back pain  Active Problems:    Lumbar radiculopathy    HOME SITUATION  Type of Home: House  Home Layout: One level (raised [place])  Lives With: Spouse  Toilet and Equipment: Comfort height toilet  Shower/Tub and Equipment: Tub-shower combo  Drives: Yes  Patient Regularly Uses: Glasses (owns RW/cane; using RW recently d/t pain)  Stairs in Home: 6 LENNY without  railing, 6 steps upstairs with railing, from garage 5+7 LENNY with railing    SUBJECTIVE  Patient agreeable to OT evaluation.    OCCUPATIONAL THERAPY EXAMINATION   OBJECTIVE  Precautions: Spine  Fall Risk: Standard fall risk    WEIGHT BEARING RESTRICTION  None    PAIN ASSESSMENT  Ratin  Location: back  Management Techniques: Activity promotion; Body mechanics; Repositioning; Ice; Nurse notified    COGNITION  Overall Cognitive Status:  WFL - within functional limits    RANGE OF MOTION   Upper extremity ROM is within functional limits     STRENGTH ASSESSMENT  Upper extremity strength is within functional limits     ACTIVITIES OF DAILY LIVING ASSESSMENT  AM-PAC ‘6-Clicks’ Inpatient Daily Activity Short Form  How much help from another person does the patient currently need…  -   Putting on and taking off regular lower body clothing?: A Lot  -   Bathing (including washing, rinsing, drying)?: A Lot  -   Toileting, which includes using toilet, bedpan or urinal? : A Little  -   Putting on and taking off regular upper body clothing?: A Little  -   Taking care of personal grooming such as brushing teeth?: A Little  -   Eating meals?: None    AM-PAC Score:  Score: 17  Approx Degree of Impairment: 50.11%  Standardized Score (AM-PAC Scale): 37.26  CMS Modifier (G-Code): CK    BED MOBILITY  Rolling: independent  Supine to Sit: independent  Comments:  Provided education on log roll to ensure spine precaution adherence during bed mobility; patient with good receptivity.    FUNCTIONAL TRANSFER ASSESSMENT  Sit to Stand from EOB: contact guard assist  Chair Transfer: contact guard assist    FUNCTIONAL MOBILITY  contact guard assist for in-room fx mobility using RW  Comments:  Patient with decreased ax tolerance/endurance s/t pain. Endorsed B LE spasms.     ACTIVITIES OF DAILY LIVING  Eating: setup assist seated (per obs)   Grooming: setup assist seated (per obs)   UB Dressing: setup assist seated (per obs)   LB Dressing: mod  assist  Toileting: min assist (per obs)     EDUCATION PROVIDED  Patient Education : Role of Occupational Therapy; Plan of Care; Discharge Recommendations; Functional Transfer Techniques; Fall Prevention; Posture/Positioning; Energy Conservation; Proper Body Mechanics (spine precautions)  Patient's Response to Education: Verbalized Understanding; Returned Demonstration    The patient's Approx Degree of Impairment: 50.11% has been calculated based on documentation in the [facility] '6 clicks' Inpatient Daily Activity Short Form.  Research supports that patients with this level of impairment may benefit from  OT.  Final disposition will be made by interdisciplinary medical team.    Patient End of Session: Up in chair;Needs met;Call light within reach;RN aware of session/findings;All patient questions and concerns addressed;Hospital anti-slip socks;Family present    OT Goals  Patient self-stated goal is: return to PLOF     Patient will complete LE dressing with SUP  Comment:     Patient will complete toilet transfer with SUP  Comment:     Patient will complete self care task at sink level with SUP   Comment:    Patient will independently recall spine/posterior/anterior hip precautions  Comment:         Goals  on: 25  Frequency: 3-5x/week    Patient Evaluation Complexity Level:   Occupational Profile/Medical History MODERATE - Expanded review of history including review of medical or therapy record   Specific performance deficits impacting engagement in ADL/IADL MODERATE  3 - 5 performance deficits   Client Assessment/Performance Deficits MODERATE - Comorbidities and min to mod modifications of tasks    Clinical Decision Making MODERATE - Analysis of occupational profile, detailed assessments, several treatment options    Overall Complexity MODERATE     OT Session Time  Therapeutic Activity: 17 minutes    EMILY Carmen/L  [facility]  #[id number]

## 2025-01-08 NOTE — PHYSICAL THERAPY NOTE
PHYSICAL THERAPY EVALUATION - INPATIENT     Room Number: 430/430-A  Evaluation Date: 1/8/2025  Type of Evaluation: Initial   Physician Order: PT Eval and Treat    Presenting Problem: acute on chronic low back pain  Co-Morbidities : LESI 12/19/24  Reason for Therapy: Mobility Dysfunction and Discharge Planning    PHYSICAL THERAPY ASSESSMENT   Patient is a 34 year old male admitted 1/7/2025 for acute on chronic low back pain. Consult spine surgery pending.  Prior to admission, patient's baseline is independent- limited mobility over the last 2 weeks 2* pain requiring intermittent use of RW.  Patient is currently functioning below baseline with bed mobility, transfers, gait, stair negotiation, maintaining seated position, standing prolonged periods, and performing household tasks.  Patient is requiring contact guard assist as a result of the following impairments: decreased functional strength, decreased endurance/aerobic capacity, and pain.  Physical Therapy will continue to follow for duration of hospitalization.    Patient will benefit from continued skilled PT Services at discharge to promote prior level of function and safety with additional support and return home with OP PT.    PLAN DURING HOSPITALIZATION  Nursing Mobility Recommendation : 1 Assist  PT Device Recommendation: Rolling walker  PT Treatment Plan: Bed mobility;Body mechanics;Endurance;Energy conservation;Patient education;Family education;Gait training;Strengthening;Stoop training;Range of motion;Transfer training;Stair training;Balance training  Rehab Potential : Good  Frequency (Obs): Daily     PHYSICAL THERAPY MEDICAL/SOCIAL HISTORY     Problem List  Principal Problem:    Acute on chronic low back pain  Active Problems:    Lumbar radiculopathy      HOME SITUATION  Type of Home: House  Home Layout: One level  Stairs to Enter : 6   Railing: Yes              Lives With: Spouse    Drives: Yes   Patient Regularly Uses: Rolling walker;Cane (utilized RW  to get to outpatient PT appt)     SUBJECTIVE  \"I can only be up for minutes then I have to lay flat\"     PHYSICAL THERAPY EXAMINATION   OBJECTIVE  Precautions: Spine  Fall Risk: Standard fall risk    PAIN ASSESSMENT  Ratin  Location: back pain and mild radiating down LLE  Management Techniques: Activity promotion;Body mechanics;Repositioning    COGNITION  Overall Cognitive Status:  WFL - within functional limits    RANGE OF MOTION AND STRENGTH ASSESSMENT  Upper extremity ROM and strength are within functional limits   Lower extremity ROM is within functional limits   Lower extremity strength is within functional limits     BALANCE  Static Sitting: Normal  Dynamic Sitting: Good  Static Standing: Fair +  Dynamic Standing: Fair      NEUROLOGICAL FINDINGS           Sensation: reports intermittent numbness and tingling LLE        AM-PAC '6-Clicks' INPATIENT SHORT FORM - BASIC MOBILITY  How much difficulty does the patient currently have...  Patient Difficulty: Turning over in bed (including adjusting bedclothes, sheets and blankets)?: A Little   Patient Difficulty: Sitting down on and standing up from a chair with arms (e.g., wheelchair, bedside commode, etc.): A Little   Patient Difficulty: Moving from lying on back to sitting on the side of the bed?: A Little   How much help from another person does the patient currently need...   Help from Another: Moving to and from a bed to a chair (including a wheelchair)?: A Little   Help from Another: Need to walk in hospital room?: A Little   Help from Another: Climbing 3-5 steps with a railing?: A Little     AM-PAC Score:  Raw Score: 18   Approx Degree of Impairment: 46.58%   Standardized Score (AM-PAC Scale): 43.63   CMS Modifier (G-Code): CK    FUNCTIONAL ABILITY STATUS  Functional Mobility/Gait Assessment  Gait Assistance: Contact guard assist  Distance (ft): 25ft  Assistive Device: Rolling walker  Pattern:  (guarded gait pattern and antalgic gait. Distance ambulated  limited by pain. Cues for upright posture and safe management of RW with turns.)  Rolling: contact guard assist  Side lying to Sit: contact guard assist  Sit to Stand: contact guard assist. Cues for appropriate UE positioning with transitional movement. Instruction on pacing upon standing to allow body time to acclimate to change in position. Encouraged elevating surface of chair at home with folded towels/blankets to ease transfer task.     Exercise/Education Provided:  Bed mobility  Body mechanics  Energy conservation  Functional activity tolerated  Gait training  Posture  ROM  Strengthening  Transfer training    Skilled Therapy Provided: Patient received supine in bed with family present. RN approved activity. Coordinated session with OT. Therapist educated pt on POC and physiological benefits of mobilization. Discussed spinal protective strategy and appropriate positioning/body mechanics to limit pain during functional mobility.     The patient's Approx Degree of Impairment: 46.58% has been calculated based on documentation in the [facility] '6 clicks' Inpatient Basic Mobility Short Form.  Research supports that patients with this level of impairment may benefit from home with outpatient PT.  Final disposition will be made by interdisciplinary medical team.    Patient End of Session: Up in chair;Needs met;Call light within reach;RN aware of session/findings;Family present;Ice applied    CURRENT GOALS  Goals to be met by: 1/22/25  Patient Goal Patient's self-stated goal is: return to PLOF   Goal #1 Patient is able to demonstrate supine - sit EOB @ level: independent     Goal #1   Current Status    Goal #2 Patient is able to demonstrate transfers Sit to/from Stand at assistance level: modified independent with  LRAD     Goal #2  Current Status    Goal #3 Patient is able to ambulate 100 feet with assist device:  LRAD  at assistance level: supervision   Goal #3   Current Status    Goal #4 Patient will negotiate 6  stairs/one curb w/ assistive device and SBA   Goal #4   Current Status    Goal #5 Patient to demonstrate independence with home activity/exercise instructions provided to patient in preparation for discharge.   Goal #5   Current Status      Patient Evaluation Complexity Level:  History Low - no personal factors and/or co-morbidities   Examination of body systems Low -  addressing 1-2 elements   Clinical Presentation Low- Stable   Clinical Decision Making  Low Complexity     Gait Trainin minutes  Therapeutic Activity:  15 minutes

## 2025-01-08 NOTE — PROGRESS NOTES
[facility] Hospitalist Progress Note     CC: Hospital Follow up    PCP: Sulma Nuñez MD       Assessment/Plan:     Principal Problem:    Acute on chronic low back pain  Active Problems:    Lumbar radiculopathy  Patient is a 34 year old male with PMH sig for MO, GERD, Herniated discs at L4-L5,S1 s/p LESI 12/19/24 who presents with back pain and LLE radiculopathy.      Back Pain   H/o Herniated discs at L4-L5,S1   - s/p LESI 12/19/24   - now with uncontrolled back pain and LLE radiculopathy  - pain control   - lidocaine patch, flexeril, gabapentin, tylenol, norco, morphine started   - PT/OT  - MRI: L5-S1 Grade 1 spondylolisthesis related to bilateral L5 pars defects.  Disc degeneration with large left foraminal disc herniation impinging on the left L5 nerve root.  Mild right foraminal narrowing.  L4-5:  Minimal retrolisthesis.  Disc degeneration with broad-based right paracentral/central disc protrusion.  Mild peripheral narrowing.  No high-grade stenosis.   - ortho spine on consult      GERD - PPI prn    SAD - Wellbutrin      FN:  - IVF: as needed   - Diet: general      DVT Prophy: SCDs (hold chemical pxx incase intervention is needed)   Atrophy: Ambulate, PT/OT  Lines: Piv     Dispo: pending clinical course     Outpatient records or previous hospital records reviewed.      Further recommendations pending patient's clinical course.  [facility] hospitalist to continue to follow patient while in house     Patient and/or patient's family given opportunity to ask questions and note understanding and agreeing with therapeutic plan as outlined     Thank You,  Jose Raul Cheatham MD     [facility] Hospitalist  Answering Service number: [phone]     Subjective:     No CP, SOB, or N/V.  Back pain is better at rest.  Still uncontrolled pain with movement.     OBJECTIVE:    Blood pressure 124/78, pulse 85, temperature 98 °F (36.7 °C), temperature source Oral, resp. rate 18, height 6' 1\" (1.854 m), weight (!) 315 lb (142.9 kg),  SpO2 95%.    Temp:  [97.7 °F (36.5 °C)-98.6 °F (37 °C)] 98 °F (36.7 °C)  Pulse:  [] 85  Resp:  [18-20] 18  BP: (120-143)/() 124/78  SpO2:  [91 %-98 %] 95 %      Intake/Output:    Intake/Output Summary (Last 24 hours) at 1/8/2025 1103  Last data filed at 1/8/2025 0927  Gross per 24 hour   Intake 750 ml   Output --   Net 750 ml       Last 3 Weights   01/07/25 1700 (!) 315 lb (142.9 kg)   01/07/25 1149 (!) 315 lb (142.9 kg)   01/07/25 1427 (!) 315 lb 0.6 oz (142.9 kg)   09/09/21 1514 293 lb (132.9 kg)       Exam   General: Alert, no acute distress  Lungs: clear to ausculation bilaterally  Heart: Regular rate and rhythm  Abdomen: soft, non tender, non distended   Extremities: No edema  Neuro: decreased ROM left leg, normal sensations  Psych: appropriate affect     Data Review:       Labs:     Recent Labs   Lab 01/07/25  1420 01/08/25  0633   RBC 5.37 5.35   HGB 16.9 16.7   HCT 46.8 47.3   MCV 87.2 88.4   MCH 31.5 31.2   MCHC 36.1 35.3   RDW 12.0 12.0   NEPRELIM 5.55 8.20*   WBC 7.7 11.1*   .0 292.0         Recent Labs   Lab 01/07/25  1420 01/08/25  0633   * 124*   BUN 15 18   CREATSERUM 0.92 0.99   EGFRCR 111 102   CA 10.2 10.1    141   K 4.3 4.5    103   CO2 26.0 29.0       No results for input(s): \"ALT\", \"AST\", \"ALB\", \"AMYLASE\", \"LIPASE\", \"LDH\" in the last 168 hours.    Invalid input(s): \"ALPHOS\", \"TBIL\", \"DBIL\", \"TPROT\"      Imaging:  MRI SPINE LUMBAR (CPT=72148)    Result Date: 1/7/2025  CONCLUSION:  1. L5-S1:  Grade 1 spondylolisthesis related to bilateral L5 pars defects.  Disc degeneration with large left foraminal disc herniation impinging on the left L5 nerve root.  Mild right foraminal narrowing. 2. L4-5:  Minimal retrolisthesis.  Disc degeneration with broad-based right paracentral/central disc protrusion.  Mild peripheral narrowing.  No high-grade stenosis.    Dictated by (CST): Andrei Rosales MD on 1/07/2025 at 9:45 PM     Finalized by (CST): Andrei Rosales MD on  1/07/2025 at 9:53 PM             Meds:      gabapentin  600 mg Oral TID    lidocaine-menthol  1 patch Transdermal Daily    buPROPion SR  150 mg Oral BID         acetaminophen **OR** HYDROcodone-acetaminophen **OR** HYDROcodone-acetaminophen    morphINE **OR** morphINE **OR** morphINE    polyethylene glycol (PEG 3350)    sennosides    bisacodyl    fleet enema    ondansetron    prochlorperazine    cyclobenzaprine

## 2025-01-09 ENCOUNTER — APPOINTMENT (OUTPATIENT)
Dept: CT IMAGING | Facility: HOSPITAL | Age: 35
End: 2025-01-09
Attending: PHYSICIAN ASSISTANT
Payer: COMMERCIAL

## 2025-01-09 LAB
ANION GAP SERPL CALC-SCNC: 7 MMOL/L (ref 0–18)
BASOPHILS # BLD AUTO: 0.1 X10(3) UL (ref 0–0.2)
BASOPHILS NFR BLD AUTO: 1.1 %
BUN BLD-MCNC: 18 MG/DL (ref 9–23)
CALCIUM BLD-MCNC: 9.8 MG/DL (ref 8.7–10.4)
CHLORIDE SERPL-SCNC: 103 MMOL/L (ref 98–112)
CO2 SERPL-SCNC: 28 MMOL/L (ref 21–32)
CREAT BLD-MCNC: 1.08 MG/DL
DEPRECATED RDW RBC AUTO: 43.2 FL (ref 35.1–46.3)
EGFRCR SERPLBLD CKD-EPI 2021: 92 ML/MIN/1.73M2 (ref 60–?)
EOSINOPHIL # BLD AUTO: 0.09 X10(3) UL (ref 0–0.7)
EOSINOPHIL NFR BLD AUTO: 1 %
ERYTHROCYTE [DISTWIDTH] IN BLOOD BY AUTOMATED COUNT: 12.6 % (ref 11–15)
GLUCOSE BLD-MCNC: 97 MG/DL (ref 70–99)
HCT VFR BLD AUTO: 51.3 %
HGB BLD-MCNC: 16.7 G/DL
IMM GRANULOCYTES # BLD AUTO: 0.08 X10(3) UL (ref 0–1)
IMM GRANULOCYTES NFR BLD: 0.9 %
LYMPHOCYTES # BLD AUTO: 4.59 X10(3) UL (ref 1–4)
LYMPHOCYTES NFR BLD AUTO: 49.5 %
MAGNESIUM SERPL-MCNC: 2.4 MG/DL (ref 1.6–2.6)
MCH RBC QN AUTO: 30.1 PG (ref 26–34)
MCHC RBC AUTO-ENTMCNC: 32.6 G/DL (ref 31–37)
MCV RBC AUTO: 92.6 FL
MONOCYTES # BLD AUTO: 0.75 X10(3) UL (ref 0.1–1)
MONOCYTES NFR BLD AUTO: 8.1 %
NEUTROPHILS # BLD AUTO: 3.66 X10 (3) UL (ref 1.5–7.7)
NEUTROPHILS # BLD AUTO: 3.66 X10(3) UL (ref 1.5–7.7)
NEUTROPHILS NFR BLD AUTO: 39.4 %
PLATELET # BLD AUTO: 293 10(3)UL (ref 150–450)
POTASSIUM SERPL-SCNC: 4.1 MMOL/L (ref 3.5–5.1)
RBC # BLD AUTO: 5.54 X10(6)UL
SODIUM SERPL-SCNC: 138 MMOL/L (ref 136–145)
WBC # BLD AUTO: 9.3 X10(3) UL (ref 4–11)

## 2025-01-09 PROCEDURE — 85025 COMPLETE CBC W/AUTO DIFF WBC: CPT | Performed by: INTERNAL MEDICINE

## 2025-01-09 PROCEDURE — 83735 ASSAY OF MAGNESIUM: CPT | Performed by: INTERNAL MEDICINE

## 2025-01-09 PROCEDURE — 80048 BASIC METABOLIC PNL TOTAL CA: CPT | Performed by: INTERNAL MEDICINE

## 2025-01-09 PROCEDURE — 72131 CT LUMBAR SPINE W/O DYE: CPT | Performed by: PHYSICIAN ASSISTANT

## 2025-01-09 PROCEDURE — 94760 N-INVAS EAR/PLS OXIMETRY 1: CPT

## 2025-01-09 PROCEDURE — 97530 THERAPEUTIC ACTIVITIES: CPT

## 2025-01-09 RX ORDER — DOCUSATE SODIUM 100 MG/1
100 CAPSULE, LIQUID FILLED ORAL 2 TIMES DAILY
Status: DISCONTINUED | OUTPATIENT
Start: 2025-01-09 | End: 2025-01-10

## 2025-01-09 RX ORDER — POLYETHYLENE GLYCOL 3350 17 G/17G
17 POWDER, FOR SOLUTION ORAL DAILY
Status: DISCONTINUED | OUTPATIENT
Start: 2025-01-09 | End: 2025-01-09

## 2025-01-09 NOTE — CHRONIC PAIN
[facility]  Report of Consultation    Oneil Wallace Patient Status:  Observation    3/4/1990 MRN [id number]   Location [facility] 4W/SW/SE Attending Zechariah Reid DO   Hosp Day # 0 PCP Sulma Nuñez MD     Date of Admission:  2025  Date of Consult:  2025    Reason for Consultation/Chief Complaint:  Lumbar radiculopathy, pain management     History of Present Illness:  Oneil Wallace is a 34 year old male with pmhx noted below. He has been having LBP for a few months. Recently on 24, had TFESI with PM&R. At that time has significant pain relief lasting 2 days. However, a few days ago he began having LLE weakness and increasing pain, despite oral pain management, and was instructed to come to ER.     Currently. Reports his pain is controlled with oral medication. He is complain of LLE weakness, and sharp radiating pain down to his foot. Pt is stating that he is not in another HAJA. Is requesting surgical consult.   Denies any changes in bowel or bladder, fever, chills, cp, or sob    History:  Past Medical History:    GERD (gastroesophageal reflux disease)    OTHER DISEASES    NONE     Past Surgical History:   Procedure Laterality Date    Other surgical history      L-knee surgery-Arthroscopic     Family History   Problem Relation Age of Onset    Other (Other) Other       reports that he has been smoking. He has never used smokeless tobacco. He reports current alcohol use. He reports current drug use. Frequency: 7.00 times per week. Drug: Cannabis.    Allergies:  Allergies[1]    Medications:    Current Facility-Administered Medications:     gabapentin (Neurontin) cap 600 mg, 600 mg, Oral, TID    acetaminophen (Tylenol) tab 650 mg, 650 mg, Oral, Q4H PRN **OR** HYDROcodone-acetaminophen (Norco) 5-325 MG per tab 1 tablet, 1 tablet, Oral, Q4H PRN **OR** HYDROcodone-acetaminophen (Norco) 5-325 MG per tab 2 tablet, 2 tablet, Oral, Q4H PRN    morphINE PF 2 MG/ML injection  1 mg, 1 mg, Intravenous, Q2H PRN **OR** morphINE PF 2 MG/ML injection 2 mg, 2 mg, Intravenous, Q2H PRN **OR** morphINE PF 4 MG/ML injection 4 mg, 4 mg, Intravenous, Q2H PRN    polyethylene glycol (PEG 3350) (Miralax) 17 g oral packet 17 g, 17 g, Oral, Daily PRN    sennosides (Senokot) tab 17.2 mg, 17.2 mg, Oral, Nightly PRN    bisacodyl (Dulcolax) 10 MG rectal suppository 10 mg, 10 mg, Rectal, Daily PRN    fleet enema (Fleet) rectal enema 133 mL, 1 enema, Rectal, Once PRN    ondansetron (Zofran) 4 MG/2ML injection 4 mg, 4 mg, Intravenous, Q6H PRN    prochlorperazine (Compazine) 10 MG/2ML injection 5 mg, 5 mg, Intravenous, Q8H PRN    cyclobenzaprine (Flexeril) tab 10 mg, 10 mg, Oral, TID PRN    buPROPion SR (WELLBUTRIN SR) 12 hr tab 150 mg, 150 mg, Oral, BID    Review of Systems:  Pertinent items are noted in HPI.    Physical Exam:  Blood pressure 123/82, pulse 71, temperature 97 °F (36.1 °C), temperature source Oral, resp. rate 18, height 6' 1\" (1.854 m), weight (!) 315 lb (142.9 kg), SpO2 95%.    General: Alert and oriented x3, NAD, appears stated age, appropriate disposition and demeanor, answers questions appropriately   Head: normocephalic, atraumatic  Eyes: anicteric; no injection  Ears: no obvious deformities noted   Nose: externally grossly within normal limits, no unusual discharge or rhinorrhea   Throat: lips grossly within normal limits by visual exam externally  Neck: supple, trachea midline, no obvious JVD  Chest: S1, S2, RRR, no obvious visual deformity  Respiratory: CTAB  Abdomen: soft, non-tender, bowel sounds present,   MSK: Left +SLR, NVID, SILT     Laboratory Data:  Lab Results   Component Value Date    WBC 9.3 01/09/2025    HGB 16.7 01/09/2025    HCT 51.3 01/09/2025    .0 01/09/2025    CREATSERUM 1.08 01/09/2025    BUN 18 01/09/2025     01/09/2025    K 4.1 01/09/2025     01/09/2025    CO2 28.0 01/09/2025    GLU 97 01/09/2025    CA 9.8 01/09/2025    MG 2.4 01/09/2025        Imaging:  PROCEDURE: MRI SPINE LUMBAR (CPT=72148)4     COMPARISON: None.     INDICATIONS: lumbar radiculopathy s/p LESI     TECHNIQUE: A variety of imaging planes and parameters were utilized for visualization of suspected pathology.     FINDINGS:  Evaluation is slightly degraded by patient-related motion artifact.    PARASPINAL AREA: Normal with no visible mass.    BONES: T12 vertebral hemangioma.  No suspicious bone lesion or acute fracture.  Vertebral bodies are intact.  CORD/CAUDA EQUINA: Normal caliber, contour, and signal intensity.    OTHER: Mild degenerative disc disease at T11-12 and T12-L1 without significant stenosis.     LUMBAR DISC LEVELS:  L1-L2: No significant disc/facet abnormality, spinal stenosis, or foraminal stenosis.    L2-L3: No significant disc/facet abnormality, spinal stenosis, or foraminal stenosis.    L3-L4: No significant disc/facet abnormality, spinal stenosis, or foraminal stenosis.    L4-L5: Minimal retrolisthesis.  Decrease in disc height with a spondylotic disc bulging and a superimposed right paracentral disc protrusion exaggerated by the retrolisthesis.  Mild peripheral narrowing.  No significant central narrowing or high-grade  peripheral narrowing.  L5-S1: Bilateral L5 pars defects with grade 1 spondylolisthesis of L5 on S1.  Disc degeneration with a left foraminal disc extrusion which results in severe foraminal narrowing and impingement on the left L5 nerve root.  Mild right foraminal narrowing.    Prominence of epidural fat attenuates the caudal thecal sac.         Impression   CONCLUSION:  1. L5-S1:  Grade 1 spondylolisthesis related to bilateral L5 pars defects.  Disc degeneration with large left foraminal disc herniation impinging on the left L5 nerve root.  Mild right foraminal narrowing.  2. L4-5:  Minimal retrolisthesis.  Disc degeneration with broad-based right paracentral/central disc protrusion.  Mild peripheral narrowing.  No high-grade stenosis.         Dictated by (CST): Andrei Rosales MD on 1/07/2025 at 9:45 PM      Finalized by (CST): Andrei Rosales MD on 1/07/2025 at 9:53 PM           Impression:  Patient Active Problem List   Diagnosis    Unspecified internal derangement of knee    Knee pain    GERD (gastroesophageal reflux disease)    Acute on chronic low back pain    Lumbar radiculopathy     Recommendations:   -pt refusing any interventions at this time  -cont with current analgesia  -dc lido patch pt states it is not helpful  -spine sx on the case   -pt can follow up with PM&R if desired   -ILPM reviewed    Will sign off, re consult if needed   ELIZABETH Amaro md and nursing team   Total Time: 45     Comprehensive analgesic plan was formulated. Conservative vs. Aggressive measures were discussed at length including pharmacotherapy (eg. Anti- inflammatories, muscle relaxants, neuropathic medications, oral steroids, analgesics), injections, and further testing. Risks and benefits of all options were discussed at length to patients satisfaction during a comprehensive interactive discussion. All questions were answered during extended questions and answer session. Patient agreeable to discussion plan. Greater than 50% of the time was spent with counseling (nature of discussion centered around pain, therapy, and treatment options), face to face time, time spent reviewing data, obtaining patient information and discussing the care with the patients health care providers.     Is this a shared or split note between Advanced Practice Provider and Physician? Yes       [1] No Known Allergies

## 2025-01-09 NOTE — PLAN OF CARE
Received patient alert and oriented x 4 resting in bed with no family present at bedside. CMS remains intact. Coldness to bilateral toes. Room air. Tolerated SCDs minimally, irritates left leg pain. Pain managed fair with 2 Norco/Flexeril/Morphine. Side effects discussed. Tolerated diet. LBM 1/6/25 Miralex given. Voiding without complications, with adequate amount of urine output. Skin remains intact. SL. Fall precautions discussed. Up with standby assist. Unsteady due to pain in left leg while walking. Vitals remain stable. Afebrile. Plan is home with wife pending pain service and possibly neuro evaluations.  Problem: Patient Centered Care  Goal: Patient preferences are identified and integrated in the patient's plan of care  Description: Interventions:  - What would you like us to know as we care for you? I live at home with my wife and two kids 3 and 5 year old.  - Provide timely, complete, and accurate information to patient/family  - Incorporate patient and family knowledge, values, beliefs, and cultural backgrounds into the planning and delivery of care  - Encourage patient/family to participate in care and decision-making at the level they choose  - Honor patient and family perspectives and choices  1/9/2025 0426 by Angelika Bonner RN  Outcome: Progressing  1/8/2025 2137 by Angelika Bonner RN  Outcome: Progressing     Problem: Patient/Family Goals  Goal: Patient/Family Long Term Goal  Description: Patient's Long Term Goal:  to return home    Interventions:  -able to walk without pain  Pain is decreased or tolerable before discharge  Vitals/labs remain stable  - See additional Care Plan goals for specific interventions  1/9/2025 0426 by Angelika Bonner RN  Outcome: Progressing  1/8/2025 2137 by Angelika Bonner RN  Outcome: Progressing  Goal: Patient/Family Short Term Goal  Description: Patient's Short Term Goal: pain is decreased or resolved prior to discharge    Interventions:   - pain is managed with  oral pain medication  Vital/labs remain stable  Able to perform ADLs without pain    - See additional Care Plan goals for specific interventions  1/9/2025 0426 by Angelika Bonner RN  Outcome: Progressing  1/8/2025 2137 by Angelika Bonner RN  Outcome: Progressing     Problem: PAIN - ADULT  Goal: Verbalizes/displays adequate comfort level or patient's stated pain goal  Description: INTERVENTIONS:  - Encourage pt to monitor pain and request assistance  - Assess pain using appropriate pain scale  - Administer analgesics based on type and severity of pain and evaluate response  - Implement non-pharmacological measures as appropriate and evaluate response  - Consider cultural and social influences on pain and pain management  - Manage/alleviate anxiety  - Utilize distraction and/or relaxation techniques  - Monitor for opioid side effects  - Notify MD/LIP if interventions unsuccessful or patient reports new pain  - Anticipate increased pain with activity and pre-medicate as appropriate  1/9/2025 0426 by Angelika Bonner RN  Outcome: Progressing  1/8/2025 2137 by Angelika Bonner RN  Outcome: Progressing     Problem: RISK FOR INFECTION - ADULT  Goal: Absence of fever/infection during anticipated neutropenic period  Description: INTERVENTIONS  - Monitor WBC  - Administer growth factors as ordered  - Implement neutropenic guidelines  1/9/2025 0426 by Angelika Bonner RN  Outcome: Progressing  1/8/2025 2137 by Angelika Bonner RN  Outcome: Progressing     Problem: SAFETY ADULT - FALL  Goal: Free from fall injury  Description: INTERVENTIONS:  - Assess pt frequently for physical needs  - Identify cognitive and physical deficits and behaviors that affect risk of falls.  - [place] fall precautions as indicated by assessment.  - Educate pt/family on patient safety including physical limitations  - Instruct pt to call for assistance with activity based on assessment  - Modify environment to reduce risk of injury  - Provide  assistive devices as appropriate  - Consider OT/PT consult to assist with strengthening/mobility  - Encourage toileting schedule  1/9/2025 0426 by Angelika Bonner RN  Outcome: Progressing  1/8/2025 2137 by Angelika Bonner RN  Outcome: Progressing     Problem: DISCHARGE PLANNING  Goal: Discharge to home or other facility with appropriate resources  Description: INTERVENTIONS:  - Identify barriers to discharge w/pt and caregiver  - Include patient/family/discharge partner in discharge planning  - Arrange for needed discharge resources and transportation as appropriate  - Identify discharge learning needs (meds, wound care, etc)  - Arrange for interpreters to assist at discharge as needed  - Consider post-discharge preferences of patient/family/discharge partner  - Complete POLST form as appropriate  - Assess patient's ability to be responsible for managing their own health  - Refer to Case Management Department for coordinating discharge planning if the patient needs post-hospital services based on physician/LIP order or complex needs related to functional status, cognitive ability or social support system  1/9/2025 0426 by Angelika Bonner RN  Outcome: Progressing  1/8/2025 2137 by Angelika Bonner RN  Outcome: Progressing     Problem: METABOLIC/FLUID AND ELECTROLYTES - ADULT  Goal: Electrolytes maintained within normal limits  Description: INTERVENTIONS:  - Monitor labs and rhythm and assess patient for signs and symptoms of electrolyte imbalances  - Administer electrolyte replacement as ordered  - Monitor response to electrolyte replacements, including rhythm and repeat lab results as appropriate  - Fluid restriction as ordered  - Instruct patient on fluid and nutrition restrictions as appropriate  1/9/2025 0426 by Angelika Bonner RN  Outcome: Progressing  1/8/2025 2137 by Angelika Bonner RN  Outcome: Progressing     Problem: SKIN/TISSUE INTEGRITY - ADULT  Goal: Skin integrity remains intact  Description:  INTERVENTIONS  - Assess and document risk factors for pressure ulcer development  - Assess and document skin integrity  - Monitor for areas of redness and/or skin breakdown  - Initiate interventions, skin care algorithm/standards of care as needed  1/9/2025 0426 by Angelika Bonner RN  Outcome: Progressing  1/8/2025 2137 by Angelika Bonner RN  Outcome: Progressing     Problem: MUSCULOSKELETAL - ADULT  Goal: Return mobility to safest level of function  Description: INTERVENTIONS:  - Assess patient stability and activity tolerance for standing, transferring and ambulating w/ or w/o assistive devices  - Assist with transfers and ambulation using safe patient handling equipment as needed  - Ensure adequate protection for wounds/incisions during mobilization  - Obtain PT/OT consults as needed  - Advance activity as appropriate  - Communicate ordered activity level and limitations with patient/family  1/9/2025 0426 by Angelika Bonner RN  Outcome: Progressing  1/8/2025 2137 by Angelika Bonner RN  Outcome: Progressing

## 2025-01-09 NOTE — PHYSICAL THERAPY NOTE
PHYSICAL THERAPY TREATMENT NOTE - INPATIENT     Room Number: 430/430-A       Presenting Problem: acute on chronic low back pain  Co-Morbidities : Hx LESI 12/19/24, herniated discs L4-5/S1    Problem List  Principal Problem:    Acute on chronic low back pain  Active Problems:    Lumbar radiculopathy      PHYSICAL THERAPY ASSESSMENT   Patient demonstrates excellent progress this session, goals  remain in progress.      Patient is requiring stand-by assist as a result of the following impairments: decreased functional strength, decreased endurance/aerobic capacity, pain, and decreased muscular endurance.     Patient continues to function below baseline with bed mobility, transfers, gait, stair negotiation, and standing prolonged periods.  Next session anticipate patient to progress bed mobility, transfers, gait, stair negotiation, and standing prolonged periods.  Physical Therapy will continue to follow patient for duration of hospitalization.    Patient continues to benefit from continued skilled PT services: at discharge to promote prior level of function and safety with additional support and return home with OP PT.    PLAN DURING HOSPITALIZATION  Nursing Mobility Recommendation : 1 Assist  PT Device Recommendation: Rolling walker  PT Treatment Plan: Bed mobility;Body mechanics;Coordination;Endurance;Energy conservation;Patient education;Family education;Gait training;Strengthening;Stoop training;Stair training;Transfer training;Balance training  Frequency (Obs): Daily     SUBJECTIVE  Pt was agreeable to therapy session.         OBJECTIVE  Precautions: Spine    WEIGHT BEARING RESTRICTION       PAIN ASSESSMENT   Rating:  (did not rate)  Location: back  Management Techniques: Activity promotion;Body mechanics;Relaxation;Repositioning    BALANCE  Static Sitting: Normal  Dynamic Sitting: Good  Static Standing: Fair +  Dynamic Standing: Fair    ACTIVITY TOLERANCE                          O2 WALK       AM-PAC '6-Clicks'  INPATIENT SHORT FORM - BASIC MOBILITY  How much difficulty does the patient currently have...  Patient Difficulty: Turning over in bed (including adjusting bedclothes, sheets and blankets)?: A Little   Patient Difficulty: Sitting down on and standing up from a chair with arms (e.g., wheelchair, bedside commode, etc.): A Little   Patient Difficulty: Moving from lying on back to sitting on the side of the bed?: A Little   How much help from another person does the patient currently need...   Help from Another: Moving to and from a bed to a chair (including a wheelchair)?: A Little   Help from Another: Need to walk in hospital room?: A Little   Help from Another: Climbing 3-5 steps with a railing?: A Little     AM-PAC Score:  Raw Score: 18   Approx Degree of Impairment: 46.58%   Standardized Score (AM-PAC Scale): 43.63   CMS Modifier (G-Code): CK    FUNCTIONAL ABILITY STATUS  Functional Mobility/Gait Assessment  Gait Assistance: Supervision  Distance (ft): 60'  Assistive Device: Rolling walker  Pattern: Within Functional Limits  Rolling: stand-by assist  Supine to Sit: stand-by assist  Sit to Supine:  NT  Sit to Stand: stand-by assist    Skilled Therapy Provided: Pt is received in the bed and spouse present and was cleared for therapy session. Pt was able to maintain all spinal precautions throughout the session. Pt is SBA with bed mobility and to transfer to the EOB. Pt denied any dizziness and light headedness. Pt is SBA with sit<>stand transfers with the RW. Pt was able to AMB in the room about 60' with the RW SBA. Pt with very good balance and safety awareness. Pt reported that he has been up ad kathleen in his room. Pt returned to sitting in the chair with all needs within reach. Pt positions himself in the recliner chair in reclined flat position. Pt is left in the chair with all needs within reach. Pt is on track to dc to home once medically cleared. Reported to the RN on the status of the pt.     The patient's  Approx Degree of Impairment: 46.58% has been calculated based on documentation in the [facility] '6 clicks' Inpatient Daily Activity Short Form.  Research supports that patients with this level of impairment may benefit from Home with assist and out patient PT when cleared by MD.  Final disposition will be made by interdisciplinary medical team.        Patient End of Session: Up in chair;Needs met;Call light within reach;RN aware of session/findings;All patient questions and concerns addressed;Hospital anti-slip socks;Family present    CURRENT GOALS   Goals to be met by: 1/22/25  Patient Goal Patient's self-stated goal is: return to PLOF   Goal #1 Patient is able to demonstrate supine - sit EOB @ level: independent     Goal #1   Current Status SBA    Goal #2 Patient is able to demonstrate transfers Sit to/from Stand at assistance level: modified independent with LRAD     Goal #2  Current Status SBA with the RW    Goal #3 Patient is able to ambulate 100 feet with assist device: LRAD at assistance level: supervision   Goal #3   Current Status 60' with the RW SBA    Goal #4 Patient will negotiate 6 stairs/one curb w/ assistive device and SBA   Goal #4   Current Status NT   Goal #5 Patient to demonstrate independence with home activity/exercise instructions provided to patient in preparation for discharge.   Goal #5   Current Status IN PROGRESS       Therapeutic Activity: 23 minutes

## 2025-01-09 NOTE — PROGRESS NOTES
[facility] Hospitalist Progress Note     CC: Hospital Follow up    PCP: Sulma Nuñez MD       Assessment/Plan:     Principal Problem:    Acute on chronic low back pain  Active Problems:    Lumbar radiculopathy  Patient is a 34 year old male with PMH sig for MO, GERD, Herniated discs at L4-L5,S1 s/p LESI 12/19/24 who presents with back pain and LLE radiculopathy.      Back Pain   H/o Herniated discs at L4-L5,S1   - s/p LESI 12/19/24   - now with uncontrolled back pain and LLE radiculopathy  - pain control , pain service consulted   - lidocaine patch, flexeril, gabapentin, tylenol, norco, morphine started   - PT/OT- ok for home  - MRI: L5-S1 Grade 1 spondylolisthesis related to bilateral L5 pars defects.  Disc degeneration with large left foraminal disc herniation impinging on the left L5 nerve root.  Mild right foraminal narrowing.  L4-5:  Minimal retrolisthesis.  Disc degeneration with broad-based right paracentral/central disc protrusion.  Mild peripheral narrowing.  No high-grade stenosis.   - ortho spine on consult- primary spine doctor out of town, discussed with [facility] team who will see today   Offered patient to go home but he feels he needs surgery inpatient       GERD - PPI prn    SAD - Wellbutrin      FN:  - IVF: as needed   - Diet: general      DVT Prophy: SCDs (hold chemical pxx incase intervention is needed)   Atrophy: Ambulate, PT/OT  Lines: Piv     Dispo: pending clinical course     Outpatient records or previous hospital records reviewed.      Further recommendations pending patient's clinical course.  [facility] hospitalist to continue to follow patient while in house     Patient and/or patient's family given opportunity to ask questions and note understanding and agreeing with therapeutic plan as outlined     Thank You,  Zechariah Reid DO      [facility] Hospitalist  Answering Service number: [phone]     Subjective:     Feels ok as long as he lays on his back, no fevers or chills, no chest pains,  no SOB     OBJECTIVE:    Blood pressure 123/82, pulse 71, temperature 97 °F (36.1 °C), temperature source Oral, resp. rate 18, height 6' 1\" (1.854 m), weight (!) 315 lb (142.9 kg), SpO2 95%.    Temp:  [96.8 °F (36 °C)-97.9 °F (36.6 °C)] 97 °F (36.1 °C)  Pulse:  [64-95] 71  Resp:  [16-18] 18  BP: (112-128)/(78-87) 123/82  SpO2:  [90 %-100 %] 95 %      Intake/Output:    Intake/Output Summary (Last 24 hours) at 1/9/2025 1357  Last data filed at 1/9/2025 0421  Gross per 24 hour   Intake 1490 ml   Output 900 ml   Net 590 ml       Last 3 Weights   01/07/25 1700 (!) 315 lb (142.9 kg)   01/07/25 1149 (!) 315 lb (142.9 kg)   01/07/25 1427 (!) 315 lb 0.6 oz (142.9 kg)   09/09/21 1514 293 lb (132.9 kg)       Exam   General: Alert, no acute distress  Lungs: clear to ausculation bilaterally  Heart: Regular rate and rhythm  Abdomen: soft, non tender, non distended   Extremities: No edema  Neuro:+ SLR on left leg, no focal weakness but having pain with lifting leg on left       Data Review:       Labs:     Recent Labs   Lab 01/07/25  1420 01/08/25  0633 01/09/25  0727   RBC 5.37 5.35 5.54   HGB 16.9 16.7 16.7   HCT 46.8 47.3 51.3   MCV 87.2 88.4 92.6   MCH 31.5 31.2 30.1   MCHC 36.1 35.3 32.6   RDW 12.0 12.0 12.6   NEPRELIM 5.55 8.20* 3.66   WBC 7.7 11.1* 9.3   .0 292.0 293.0         Recent Labs   Lab 01/07/25  1420 01/08/25  0633 01/09/25  0727 01/09/25  0845   * 124* 97  --    BUN 15 18  --  18   CREATSERUM 0.92 0.99 1.08  --    EGFRCR 111 102 92  --    CA 10.2 10.1 9.8  --     141 138  --    K 4.3 4.5  --  4.1    103 103  --    CO2 26.0 29.0 28.0  --        No results for input(s): \"ALT\", \"AST\", \"ALB\", \"AMYLASE\", \"LIPASE\", \"LDH\" in the last 168 hours.    Invalid input(s): \"ALPHOS\", \"TBIL\", \"DBIL\", \"TPROT\"      Imaging:  CT SPINE LUMBAR (CPT=72131)    Result Date: 1/9/2025  CONCLUSION:   1. L5-S1:  Severe left and mild-to-moderate right neural foraminal stenosis. 2. L4-L5:  Mild right lateral recess  with minor bilateral neural foraminal stenosis.  3. Grade I anterolisthesis of L5 relative to S1, related to bilateral L5-S1 spondylolysis.  4. Slight dextroscoliosis of the lumbar spine.  5. Minor but advanced for patient age left iliac artery atherosclerosis.   elm-remote  Dictated by (CST): Reese Hart MD on 1/09/2025 at 12:56 PM     Finalized by (CST): Reese Hart MD on 1/09/2025 at 1:01 PM          MRI SPINE LUMBAR (CPT=72148)    Result Date: 1/7/2025  CONCLUSION:  1. L5-S1:  Grade 1 spondylolisthesis related to bilateral L5 pars defects.  Disc degeneration with large left foraminal disc herniation impinging on the left L5 nerve root.  Mild right foraminal narrowing. 2. L4-5:  Minimal retrolisthesis.  Disc degeneration with broad-based right paracentral/central disc protrusion.  Mild peripheral narrowing.  No high-grade stenosis.    Dictated by (CST): Andrei Rosales MD on 1/07/2025 at 9:45 PM     Finalized by (CST): Andrei Rosales MD on 1/07/2025 at 9:53 PM             Meds:      docusate sodium  100 mg Oral BID    gabapentin  600 mg Oral TID    buPROPion SR  150 mg Oral BID         acetaminophen **OR** HYDROcodone-acetaminophen **OR** HYDROcodone-acetaminophen    morphINE **OR** morphINE **OR** morphINE    polyethylene glycol (PEG 3350)    sennosides    bisacodyl    fleet enema    ondansetron    prochlorperazine    cyclobenzaprine

## 2025-01-09 NOTE — PLAN OF CARE
Pt a/o x4. On RA. C/o spasm type pain in his L leg. Taking norco, flexeril, and morphine for pain. Up SBA. Feels the most pain while sitting upright or walking, ok lying flat. Dr. Romero consulted today.      Problem: PAIN - ADULT  Goal: Verbalizes/displays adequate comfort level or patient's stated pain goal  Description: INTERVENTIONS:  - Encourage pt to monitor pain and request assistance  - Assess pain using appropriate pain scale  - Administer analgesics based on type and severity of pain and evaluate response  - Implement non-pharmacological measures as appropriate and evaluate response  - Consider cultural and social influences on pain and pain management  - Manage/alleviate anxiety  - Utilize distraction and/or relaxation techniques  - Monitor for opioid side effects  - Notify MD/LIP if interventions unsuccessful or patient reports new pain  - Anticipate increased pain with activity and pre-medicate as appropriate  Outcome: Progressing     Problem: RISK FOR INFECTION - ADULT  Goal: Absence of fever/infection during anticipated neutropenic period  Description: INTERVENTIONS  - Monitor WBC  - Administer growth factors as ordered  - Implement neutropenic guidelines  Outcome: Progressing     Problem: SAFETY ADULT - FALL  Goal: Free from fall injury  Description: INTERVENTIONS:  - Assess pt frequently for physical needs  - Identify cognitive and physical deficits and behaviors that affect risk of falls.  - [place] fall precautions as indicated by assessment.  - Educate pt/family on patient safety including physical limitations  - Instruct pt to call for assistance with activity based on assessment  - Modify environment to reduce risk of injury  - Provide assistive devices as appropriate  - Consider OT/PT consult to assist with strengthening/mobility  - Encourage toileting schedule  Outcome: Progressing     Problem: DISCHARGE PLANNING  Goal: Discharge to home or other facility with appropriate  resources  Description: INTERVENTIONS:  - Identify barriers to discharge w/pt and caregiver  - Include patient/family/discharge partner in discharge planning  - Arrange for needed discharge resources and transportation as appropriate  - Identify discharge learning needs (meds, wound care, etc)  - Arrange for interpreters to assist at discharge as needed  - Consider post-discharge preferences of patient/family/discharge partner  - Complete POLST form as appropriate  - Assess patient's ability to be responsible for managing their own health  - Refer to Case Management Department for coordinating discharge planning if the patient needs post-hospital services based on physician/LIP order or complex needs related to functional status, cognitive ability or social support system  Outcome: Progressing     Problem: METABOLIC/FLUID AND ELECTROLYTES - ADULT  Goal: Electrolytes maintained within normal limits  Description: INTERVENTIONS:  - Monitor labs and rhythm and assess patient for signs and symptoms of electrolyte imbalances  - Administer electrolyte replacement as ordered  - Monitor response to electrolyte replacements, including rhythm and repeat lab results as appropriate  - Fluid restriction as ordered  - Instruct patient on fluid and nutrition restrictions as appropriate  Outcome: Progressing     Problem: SKIN/TISSUE INTEGRITY - ADULT  Goal: Skin integrity remains intact  Description: INTERVENTIONS  - Assess and document risk factors for pressure ulcer development  - Assess and document skin integrity  - Monitor for areas of redness and/or skin breakdown  - Initiate interventions, skin care algorithm/standards of care as needed  Outcome: Progressing     Problem: MUSCULOSKELETAL - ADULT  Goal: Return mobility to safest level of function  Description: INTERVENTIONS:  - Assess patient stability and activity tolerance for standing, transferring and ambulating w/ or w/o assistive devices  - Assist with transfers and  ambulation using safe patient handling equipment as needed  - Ensure adequate protection for wounds/incisions during mobilization  - Obtain PT/OT consults as needed  - Advance activity as appropriate  - Communicate ordered activity level and limitations with patient/family  Outcome: Progressing

## 2025-01-10 VITALS
OXYGEN SATURATION: 95 % | DIASTOLIC BLOOD PRESSURE: 87 MMHG | HEIGHT: 73 IN | WEIGHT: 315 LBS | BODY MASS INDEX: 41.75 KG/M2 | HEART RATE: 97 BPM | TEMPERATURE: 98 F | SYSTOLIC BLOOD PRESSURE: 132 MMHG | RESPIRATION RATE: 18 BRPM

## 2025-01-10 LAB
ANION GAP SERPL CALC-SCNC: 4 MMOL/L (ref 0–18)
BASOPHILS # BLD AUTO: 0.11 X10(3) UL (ref 0–0.2)
BASOPHILS NFR BLD AUTO: 1.4 %
BUN BLD-MCNC: 15 MG/DL (ref 9–23)
BUN/CREAT SERPL: 12.3 (ref 10–20)
CALCIUM BLD-MCNC: 10 MG/DL (ref 8.7–10.4)
CHLORIDE SERPL-SCNC: 103 MMOL/L (ref 98–112)
CO2 SERPL-SCNC: 33 MMOL/L (ref 21–32)
CREAT BLD-MCNC: 1.22 MG/DL
DEPRECATED RDW RBC AUTO: 40.7 FL (ref 35.1–46.3)
EGFRCR SERPLBLD CKD-EPI 2021: 79 ML/MIN/1.73M2 (ref 60–?)
EOSINOPHIL # BLD AUTO: 0.09 X10(3) UL (ref 0–0.7)
EOSINOPHIL NFR BLD AUTO: 1.2 %
ERYTHROCYTE [DISTWIDTH] IN BLOOD BY AUTOMATED COUNT: 12.3 % (ref 11–15)
GLUCOSE BLD-MCNC: 96 MG/DL (ref 70–99)
HCT VFR BLD AUTO: 48.7 %
HGB BLD-MCNC: 16.1 G/DL
IMM GRANULOCYTES # BLD AUTO: 0.09 X10(3) UL (ref 0–1)
IMM GRANULOCYTES NFR BLD: 1.2 %
LYMPHOCYTES # BLD AUTO: 3.59 X10(3) UL (ref 1–4)
LYMPHOCYTES NFR BLD AUTO: 46.3 %
MAGNESIUM SERPL-MCNC: 2.4 MG/DL (ref 1.6–2.6)
MCH RBC QN AUTO: 30.1 PG (ref 26–34)
MCHC RBC AUTO-ENTMCNC: 33.1 G/DL (ref 31–37)
MCV RBC AUTO: 91 FL
MONOCYTES # BLD AUTO: 0.77 X10(3) UL (ref 0.1–1)
MONOCYTES NFR BLD AUTO: 9.9 %
NEUTROPHILS # BLD AUTO: 3.1 X10 (3) UL (ref 1.5–7.7)
NEUTROPHILS # BLD AUTO: 3.1 X10(3) UL (ref 1.5–7.7)
NEUTROPHILS NFR BLD AUTO: 40 %
OSMOLALITY SERPL CALC.SUM OF ELEC: 291 MOSM/KG (ref 275–295)
PLATELET # BLD AUTO: 289 10(3)UL (ref 150–450)
POTASSIUM SERPL-SCNC: 4.7 MMOL/L (ref 3.5–5.1)
RBC # BLD AUTO: 5.35 X10(6)UL
SODIUM SERPL-SCNC: 140 MMOL/L (ref 136–145)
WBC # BLD AUTO: 7.8 X10(3) UL (ref 4–11)

## 2025-01-10 PROCEDURE — 80048 BASIC METABOLIC PNL TOTAL CA: CPT | Performed by: INTERNAL MEDICINE

## 2025-01-10 PROCEDURE — 83735 ASSAY OF MAGNESIUM: CPT | Performed by: INTERNAL MEDICINE

## 2025-01-10 PROCEDURE — 97530 THERAPEUTIC ACTIVITIES: CPT

## 2025-01-10 PROCEDURE — 85025 COMPLETE CBC W/AUTO DIFF WBC: CPT | Performed by: INTERNAL MEDICINE

## 2025-01-10 RX ORDER — CYCLOBENZAPRINE HCL 10 MG
10 TABLET ORAL 3 TIMES DAILY PRN
Qty: 30 TABLET | Refills: 0 | Status: SHIPPED | OUTPATIENT
Start: 2025-01-10 | End: 2025-01-20

## 2025-01-10 RX ORDER — PSEUDOEPHEDRINE HCL 30 MG
100 TABLET ORAL 2 TIMES DAILY
Qty: 60 CAPSULE | Refills: 0 | Status: SHIPPED | OUTPATIENT
Start: 2025-01-10

## 2025-01-10 RX ORDER — HYDROCODONE BITARTRATE AND ACETAMINOPHEN 5; 325 MG/1; MG/1
2 TABLET ORAL EVERY 6 HOURS PRN
Qty: 56 TABLET | Refills: 0 | Status: SHIPPED | OUTPATIENT
Start: 2025-01-10 | End: 2025-01-17

## 2025-01-10 NOTE — PAYOR COMM NOTE
--------------  ADMISSION REVIEW     Payor: NEENA SIMEON  Subscriber #:  [id number]  Authorization Number: [id number]    ADMIT TO INPT STATUS 1/9/25  ADMIT TO OBSERVATION 1/7/25 1/7 Patient Seen in: [facility] Emergency Department    History   Stated Complaint:  back pain  34-year-old male past medical history of lumbar pain for past several months now status post LESI December 19 presenting with ongoing/worsening pain despite flexeril/prednisone taper/norco.  No trauma or overexertion.  No focal weakness or incontinence, saddle anesthesia.  No anticoagulant antiplatelets.  No fevers or chills.  No urinary complaints.  Symptoms with radiation into left lower extremity and with further descriptions of left cramping/\"charley horse\" to lower extremity without preceding vomiting/diarrhea.    Past Medical History:    GERD (gastroesophageal reflux disease)    OTHER DISEASES    NONE     Past Surgical History:   Procedure Laterality Date    Other surgical history      L-knee surgery-Arthroscopic     Physical Exam     ED Triage Vitals [01/07/25 1150]   BP (!) 143/100   Pulse 94   Resp 18   Temp 98.2 °F (36.8 °C)   Temp src Rectal   SpO2 95 %   O2 Device None (Room air)     Current:BP (!) 143/100   Pulse 94   Temp 98.2 °F (36.8 °C) (Rectal)   Resp 18   Ht 185.4 cm (6' 1\")   Wt (!) 142.9 kg   SpO2 95%   BMI 41.56 kg/m²     Physical Exam   Constitutional: Nontoxic, uncomfortable appearing.  HEENT: MMM.  Head: Normocephalic.   Eyes: No injection.   Cardiovascular: RRR.  Lower extremities with 2+ DP/PT pulses.  Pulmonary/Chest: Effort normal. CTAB.  Abdominal: Soft.  Nontender.  Musculoskeletal: No gross deformity.  Paralumbar spasm without cutaneous crepitance changing with soft compartments without midline step-off/deformity.  Neurological: Alert.  Lower extremities with 5/5 strength proximally distally with 2+ patellar DTRs and intact EHL.    Labs Reviewed   BASIC METABOLIC PANEL (8) - Abnormal; Notable for the  following components:       Result Value    Glucose 108 (*)     All other components within normal limits   CK CREATINE KINASE (NOT CREATININE) - Normal   URINALYSIS, ROUTINE   CBC WITH DIFFERENTIAL WITH PLATELET     Time: 01/07 1409  Comment: Pain improving though ongoing particularly with movement - will admit for ongoing management including specialist evaluation with repeat MRI pending at time of admission.     Medical Decision Making  Evaluation for acute on chronic lumbar pain with radiation into left lower extremity consistent with radiculopathy without interval/preceding trauma or constitutional complaints without neurovascular compromise; pain improved though ongoing after parenteral medications, admit for ongoing management including specialty evaluation and repeat imaging with MRI pending at time of admission.  Case discussed with [facility] hospitalist Dr. Cheatham for admission with physiatry/spine Dr. Lauren notified of consultation and recommendations pending.    Amount and/or Complexity of Data Reviewed  External Data Reviewed: notes.     Details: Physiatry note from 1/2/2025 reviewed  Labs: ordered. Decision-making details documented in ED Course.  Radiology: ordered.  Discussion of management or test interpretation with external provider(s): Case d/w [facility] hospitalist Dr. Cheatham for admission with physiatry Dr. Lauren notified of consultation    Disposition and Plan     Clinical Impression:  1. Acute on chronic low back pain    2. Lumbar radiculopathy        HOSPITALIST:    History and Physical     History of Present Illness: Patient is a 34 year old male with PMH sig for MO, GERD, Herniated discs at L4-L5,S1 s/p LESI 12/19/24 who presents with back pain and LLE radiculopathy.  Denies any loss of bowel or bladder control, no saddle anesthesia.  Denies any chest pain or shortness of breath, abdominal pain, nausea or vomiting, fever or chills.     In the ED he is afebrile, hypertensive, on RA  Labs  unremarkable   UA bland     OBJECTIVE:  BP (!) 136/94   Pulse 100   Temp 98.2 °F (36.8 °C) (Rectal)   Resp 18   Ht 6' 1\" (1.854 m)   Wt (!) 315 lb (142.9 kg)   SpO2 98%   BMI 41.56 kg/m²   General: Alert, no acute distress  HEENT: oral mucosa normal   Neck: non tender, no adenopathy   Lungs: clear to ausculation bilaterally  Heart: Regular rate and rhythm  Abdomen: soft, non tender, non distended   Extremities: No edema  Skin: no new rash, normal color  Neuro: decreased ROM left leg, normal sensations    ASSESSMENT / PLAN:   Patient is a 34 year old male with PMH sig for MO, GERD, Herniated discs at L4-L5,S1 s/p LESI 12/19/24 who presents with back pain and LLE radiculopathy.      Back Pain   H/o Herniated discs at L4-L5,S1   - s/p LESI 12/19/24   - now with uncontrolled back pain and LLE radiculopathy  - ortho spine on consult   - pain control   - lidocaine patch, flexeril, gabapentin, tylenol, norco, morphine started   - PT/OT  - f/u MRI      GERD - PPI prn    SAD - Wellbutrin      FN:  - IVF: as needed   - Diet: general      DVT Prophy: SCDs (hold chemical pxx incase intervention is needed)   Atrophy: Ambulate, PT/OT  Lines: Piv     Dispo: pending clinical course    1/8:    HOSPITALIST:     Assessment/Plan:      Principal Problem:    Acute on chronic low back pain  Active Problems:    Lumbar radiculopathy  Patient is a 34 year old male with PMH sig for MO, GERD, Herniated discs at L4-L5,S1 s/p LESI 12/19/24 who presents with back pain and LLE radiculopathy.      Back Pain   H/o Herniated discs at L4-L5,S1   - s/p LESI 12/19/24   - now with uncontrolled back pain and LLE radiculopathy  - pain control   - lidocaine patch, flexeril, gabapentin, tylenol, norco, morphine started   - PT/OT  - MRI: L5-S1 Grade 1 spondylolisthesis related to bilateral L5 pars defects.  Disc degeneration with large left foraminal disc herniation impinging on the left L5 nerve root.  Mild right foraminal narrowing.  L4-5:  Minimal  retrolisthesis.  Disc degeneration with broad-based right paracentral/central disc protrusion.  Mild peripheral narrowing.  No high-grade stenosis.   - ortho spine on consult       GERD - PPI prn    SAD - Wellbutrin      FN:  - IVF: as needed   - Diet: general      DVT Prophy: SCDs (hold chemical pxx incase intervention is needed)   Atrophy: Ambulate, PT/OT  Lines: Piv     Dispo: pending clinical course     Subjective:      No CP, SOB, or N/V.  Back pain is better at rest.  Still uncontrolled pain with movement.      Blood pressure 124/78, pulse 85, temperature 98 °F (36.7 °C), temperature source Oral, resp. rate 18, height 6' 1\" (1.854 m), weight (!) 315 lb (142.9 kg), SpO2 95%.     Temp:  [97.7 °F (36.5 °C)-98.6 °F (37 °C)] 98 °F (36.7 °C)  Pulse:  [] 85  Resp:  [18-20] 18  BP: (120-143)/() 124/78  SpO2:  [91 %-98 %] 95 %     General: Alert, no acute distress  Lungs: clear to ausculation bilaterally  Heart: Regular rate and rhythm  Abdomen: soft, non tender, non distended   Extremities: No edema  Neuro: decreased ROM left leg, normal sensations  Psych: appropriate affect      Lab 01/07/25  1420 01/08/25  0633   RBC 5.37 5.35   HGB 16.9 16.7   HCT 46.8 47.3   MCV 87.2 88.4   MCH 31.5 31.2   MCHC 36.1 35.3   RDW 12.0 12.0   NEPRELIM 5.55 8.20*   WBC 7.7 11.1*   .0 292.0      * 124*   BUN 15 18   CREATSERUM 0.92 0.99   EGFRCR 111 102   CA 10.2 10.1    141   K 4.3 4.5    103   CO2 26.0 29.0     IV MORPHINE x 2  NORCO x 6      1/9:    PAIN SERVICE:    History of Present Illness:  Oneil Wallace is a 34 year old male with pmhx noted below. He has been having LBP for a few months. Recently on 12/19/24, had TFESI with PM&R. At that time has significant pain relief lasting 2 days. However, a few days ago he began having LLE weakness and increasing pain, despite oral pain management, and was instructed to come to ER.      Currently. Reports his pain is controlled with oral medication.  He is complain of LLE weakness, and sharp radiating pain down to his foot. Pt is stating that he is not in another HAJA. Is requesting surgical consult.       Current Facility-Administered Medications:     gabapentin (Neurontin) cap 600 mg, 600 mg, Oral, TID    acetaminophen (Tylenol) tab 650 mg, 650 mg, Oral, Q4H PRN **OR** HYDROcodone-acetaminophen (Norco) 5-325 MG per tab 1 tablet, 1 tablet, Oral, Q4H PRN **OR** HYDROcodone-acetaminophen (Norco) 5-325 MG per tab 2 tablet, 2 tablet, Oral, Q4H PRN    morphINE PF 2 MG/ML injection 1 mg, 1 mg, Intravenous, Q2H PRN **OR** morphINE PF 2 MG/ML injection 2 mg, 2 mg, Intravenous, Q2H PRN **OR** morphINE PF 4 MG/ML injection 4 mg, 4 mg, Intravenous, Q2H PRN    polyethylene glycol (PEG 3350) (Miralax) 17 g oral packet 17 g, 17 g, Oral, Daily PRN    sennosides (Senokot) tab 17.2 mg, 17.2 mg, Oral, Nightly PRN    bisacodyl (Dulcolax) 10 MG rectal suppository 10 mg, 10 mg, Rectal, Daily PRN    fleet enema (Fleet) rectal enema 133 mL, 1 enema, Rectal, Once PRN    ondansetron (Zofran) 4 MG/2ML injection 4 mg, 4 mg, Intravenous, Q6H PRN    prochlorperazine (Compazine) 10 MG/2ML injection 5 mg, 5 mg, Intravenous, Q8H PRN    cyclobenzaprine (Flexeril) tab 10 mg, 10 mg, Oral, TID PRN    buPROPion SR (WELLBUTRIN SR) 12 hr tab 150 mg, 150 mg, Oral, BID        Review of Systems:  Pertinent items are noted in HPI.     Physical Exam:  Blood pressure 123/82, pulse 71, temperature 97 °F (36.1 °C), temperature source Oral, resp. rate 18, height 6' 1\" (1.854 m), weight (!) 315 lb (142.9 kg), SpO2 95%.     MSK: Left +SLR, NVID, SILT   Lab Results   Component Value Date     WBC 9.3 01/09/2025     HGB 16.7 01/09/2025     HCT 51.3 01/09/2025     .0 01/09/2025     CREATSERUM 1.08 01/09/2025     BUN 18 01/09/2025      01/09/2025     K 4.1 01/09/2025      01/09/2025     CO2 28.0 01/09/2025     GLU 97 01/09/2025     CA 9.8 01/09/2025     MG 2.4 01/09/2025     Impression:   Acute on  chronic low back pain    Lumbar radiculopathy      Recommendations:   -pt refusing any interventions at this time  -cont with current analgesia  -dc lido patch pt states it is not helpful  -spine sx on the case   -pt can follow up with PM&R if desired   -ILPM reviewed    HOSPITALIST:   Assessment/Plan:      Principal Problem:    Acute on chronic low back pain  Active Problems:    Lumbar radiculopathy  Patient is a 34 year old male with PMH sig for MO, GERD, Herniated discs at L4-L5,S1 s/p LESI 12/19/24 who presents with back pain and LLE radiculopathy.      Back Pain   H/o Herniated discs at L4-L5,S1   - s/p LESI 12/19/24   - now with uncontrolled back pain and LLE radiculopathy  - pain control , pain service consulted   - lidocaine patch, flexeril, gabapentin, tylenol, norco, morphine started   - PT/OT- ok for home  - MRI: L5-S1 Grade 1 spondylolisthesis related to bilateral L5 pars defects.  Disc degeneration with large left foraminal disc herniation impinging on the left L5 nerve root.  Mild right foraminal narrowing.  L4-5:  Minimal retrolisthesis.  Disc degeneration with broad-based right paracentral/central disc protrusion.  Mild peripheral narrowing.  No high-grade stenosis.   - ortho spine on consult- primary spine doctor out of town, discussed with [facility] team who will see today   Offered patient to go home but he feels he needs surgery inpatient        GERD - PPI prn    SAD - Wellbutrin      FN:  - IVF: as needed   - Diet: general      DVT Prophy: SCDs (hold chemical pxx incase intervention is needed)   Atrophy: Ambulate, PT/OT  Lines: Piv     Dispo: pending clinical course     Subjective:      Feels ok as long as he lays on his back, no fevers or chills, no chest pains, no SOB      Blood pressure 123/82, pulse 71, temperature 97 °F (36.1 °C), temperature source Oral, resp. rate 18, height 6' 1\" (1.854 m), weight (!) 315 lb (142.9 kg), SpO2 95%.    General: Alert, no acute distress  Lungs: clear to  ausculation bilaterally  Heart: Regular rate and rhythm  Abdomen: soft, non tender, non distended   Extremities: No edema  Neuro:+ SLR on left leg, no focal weakness but having pain with lifting leg on left     CT SPINE LUMBAR   Result Date: 1/9/2025    1. L5-S1:  Severe left and mild-to-moderate right neural foraminal stenosis. 2. L4-L5:  Mild right lateral recess with minor bilateral neural foraminal stenosis.  3. Grade I anterolisthesis of L5 relative to S1, related to bilateral L5-S1 spondylolysis.  4. Slight dextroscoliosis of the lumbar spine.  5. Minor but advanced for patient age left iliac artery atherosclerosis.    IV MORPHINE x 3  NORCO x 4    1/10:     NURSING:    Patient will be going home and will be coming back to have his surgery next Friday with Dr. Romero.

## 2025-01-10 NOTE — PHYSICAL THERAPY NOTE
PHYSICAL THERAPY TREATMENT NOTE - INPATIENT     Room Number: 430/430-A       Presenting Problem: acute on chronic low back pain  Co-Morbidities : Hx LESI 12/19/24, herniated discs L4-5/S1    Problem List  Principal Problem:    Acute on chronic low back pain  Active Problems:    Lumbar radiculopathy      PHYSICAL THERAPY ASSESSMENT   Patient demonstrates good  progress this session, goals  remain in progress.      Patient is requiring stand-by assist as a result of the following impairments: decreased functional strength, decreased endurance/aerobic capacity, pain, and decreased muscular endurance.     Patient continues to function below baseline with bed mobility, transfers, gait, stair negotiation, and standing prolonged periods.  Next session anticipate patient to progress bed mobility, transfers, gait, stair negotiation, and standing prolonged periods.  Physical Therapy will continue to follow patient for duration of hospitalization.    Patient continues to benefit from continued skilled PT services: at discharge to promote prior level of function and safety with additional support and return home with OP PT.    PLAN DURING HOSPITALIZATION  Nursing Mobility Recommendation : 1 Assist  PT Device Recommendation: Rolling walker  PT Treatment Plan: Bed mobility;Body mechanics;Coordination;Endurance;Energy conservation;Patient education;Gait training;Strengthening;Stoop training;Stair training;Transfer training;Balance training  Frequency (Obs): Daily     SUBJECTIVE  Pt was agreeable to therapy session.         OBJECTIVE  Precautions: Spine    WEIGHT BEARING RESTRICTION       PAIN ASSESSMENT   Rating:  (did not rate)  Location: back/L LE  Management Techniques: Activity promotion;Body mechanics;Relaxation;Repositioning    BALANCE  Static Sitting: Normal  Dynamic Sitting: Good  Static Standing: Fair +  Dynamic Standing: Fair    ACTIVITY TOLERANCE                          O2 WALK       AM-PAC '6-Clicks' INPATIENT SHORT  FORM - BASIC MOBILITY  How much difficulty does the patient currently have...  Patient Difficulty: Turning over in bed (including adjusting bedclothes, sheets and blankets)?: A Little   Patient Difficulty: Sitting down on and standing up from a chair with arms (e.g., wheelchair, bedside commode, etc.): A Little   Patient Difficulty: Moving from lying on back to sitting on the side of the bed?: A Little   How much help from another person does the patient currently need...   Help from Another: Moving to and from a bed to a chair (including a wheelchair)?: A Little   Help from Another: Need to walk in hospital room?: A Little   Help from Another: Climbing 3-5 steps with a railing?: A Little     AM-PAC Score:  Raw Score: 18   Approx Degree of Impairment: 46.58%   Standardized Score (AM-PAC Scale): 43.63   CMS Modifier (G-Code): CK    FUNCTIONAL ABILITY STATUS  Functional Mobility/Gait Assessment  Gait Assistance: Supervision  Distance (ft): 60'  Assistive Device: Rolling walker  Pattern: Within Functional Limits  Rolling: stand-by assist  Supine to Sit: stand-by assist  Sit to Supine: stand-by assist  Sit to Stand: stand-by assist    Skilled Therapy Provided:  Pt is received in the bed and spouse present and was cleared for therapy session. Pt was able to maintain all spinal precautions throughout the session. Pt is SBA with bed mobility and to transfer to the EOB. Pt denied any dizziness and light headedness. Pt is SBA with sit<>stand transfers with the RW. Pt was able to AMB in the room about 60' with the RW SBA. Pt with very good balance and safety awareness. Pt reported that he has been up ad kathleen in his room. Pt declined stairs this session. He reported that he would be fine when dc to home.  Pt returned to supine in the bed with all needs within reach. Pt positions himself in the recliner chair in reclined flat position. Pt is left in the chair with all needs within reach. Pt is on track to dc to home once  medically cleared. Reported to the RN on the status of the pt.     The patient's Approx Degree of Impairment: 46.58% has been calculated based on documentation in the [facility] '6 clicks' Inpatient Daily Activity Short Form.  Research supports that patients with this level of impairment may benefit from Home with assist and out patient therapy when cleared by MD.  Final disposition will be made by interdisciplinary medical team.      Patient End of Session: In bed;Needs met;Call light within reach;RN aware of session/findings;All patient questions and concerns addressed;Hospital anti-slip socks    CURRENT GOALS   Goals to be met by: 1/22/25  Patient Goal Patient's self-stated goal is: return to PLOF   Goal #1 Patient is able to demonstrate supine - sit EOB @ level: independent     Goal #1   Current Status SBA    Goal #2 Patient is able to demonstrate transfers Sit to/from Stand at assistance level: modified independent with LRAD     Goal #2  Current Status SBA with the RW    Goal #3 Patient is able to ambulate 100 feet with assist device: LRAD at assistance level: supervision   Goal #3   Current Status 60' with the RW SBA    Goal #4 Patient will negotiate 6 stairs/one curb w/ assistive device and SBA   Goal #4   Current Status NT Pt declined this session. He reported that he would be fine when dc to home.    Goal #5 Patient to demonstrate independence with home activity/exercise instructions provided to patient in preparation for discharge.   Goal #5   Current Status IN PROGRESS       Therapeutic Activity: 23 minutes

## 2025-01-10 NOTE — DISCHARGE SUMMARY
General Medicine Discharge Summary     Patient ID:  Oneil Wallace  34 year old  3/4/1990    Admit date: 1/7/2025    Discharge date and time: 1/10/2025    Attending Physician: Zechariah Reid DO     Consults: IP CONSULT TO ORTHO SPINE    Primary Care Physician: Sulma Nuñez MD     Reason for admission: Worsening back pain    Risk For Readmission: Moderate    Discharge Diagnoses: Lumbar radiculopathy [M54.16]  Acute on chronic low back pain [M54.50, G89.29]  See Additional Discharge Diagnoses in Hospital Course    Discharged Condition: good    Follow-up with labs/images appointments:   Close follow-up with primary care provider recommended as needed  Follow-up with orthospine surgery next week    Exam  Gen: No acute distress  Pulm: Lungs clear, normal respiratory effort  CV: Heart with regular rate and rhythm  Abd: Abdomen soft,   EXT: no edema     HPI:   Patient is a 34 year old male with PMH sig for MO, GERD, Herniated discs at L4-L5,S1 s/p LESI 12/19/24 who presents with back pain and LLE radiculopathy.  Denies any loss of bowel or bladder control, no saddle anesthesia.  Denies any chest pain or shortness of breath, abdominal pain, nausea or vomiting, fever or chills.     In the ED he is afebrile, hypertensive, on RA  Labs unremarkable   UA bland       Hospital Course:   Patient was admitted for recurrent severe back pain found to have significant left lower extremity radiculopathy with bulging disc compressing L5 nerve root spine surgery was ultimately consulted patient was started on pain medication and muscle relaxers with plans for surgery next week as an outpatient.         Operative Procedures:      Imaging: CT SPINE LUMBAR (CPT=72131)    Result Date: 1/9/2025  CONCLUSION:   1. L5-S1:  Severe left and mild-to-moderate right neural foraminal stenosis. 2. L4-L5:  Mild right lateral recess with minor bilateral neural foraminal stenosis.  3. Grade I anterolisthesis of L5 relative to S1, related to bilateral  L5-S1 spondylolysis.  4. Slight dextroscoliosis of the lumbar spine.  5. Minor but advanced for patient age left iliac artery atherosclerosis.   elm-remote  Dictated by (CST): Reese Hart MD on 1/09/2025 at 12:56 PM     Finalized by (CST): Reese Hart MD on 1/09/2025 at 1:01 PM          MRI SPINE LUMBAR (CPT=72148)    Result Date: 1/7/2025  CONCLUSION:  1. L5-S1:  Grade 1 spondylolisthesis related to bilateral L5 pars defects.  Disc degeneration with large left foraminal disc herniation impinging on the left L5 nerve root.  Mild right foraminal narrowing. 2. L4-5:  Minimal retrolisthesis.  Disc degeneration with broad-based right paracentral/central disc protrusion.  Mild peripheral narrowing.  No high-grade stenosis.    Dictated by (CST): Andrei Rosales MD on 1/07/2025 at 9:45 PM     Finalized by (CST): Andrei Rosales MD on 1/07/2025 at 9:53 PM           Disposition: home    Activity: activity as tolerated  Diet: regular diet  Wound Care: none needed  Code Status: Full Code  O2: None    Home Medication Changes: See list below     Med list     Medication List        START taking these medications      docusate sodium 100 MG Caps  Commonly known as: COLACE  Take 100 mg by mouth 2 (two) times daily.            CHANGE how you take these medications      cyclobenzaprine 10 MG Tabs  Commonly known as: Flexeril  Take 1 tablet (10 mg total) by mouth 3 (three) times daily as needed for Muscle spasms.  What changed: when to take this            CONTINUE taking these medications      buPROPion  MG Tb12  Commonly known as: WELLBUTRIN SR     gabapentin 300 MG Caps  Commonly known as: Neurontin     HYDROcodone-acetaminophen 5-325 MG Tabs  Commonly known as: Norco  Take 2 tablets by mouth every 6 (six) hours as needed.            STOP taking these medications      lidocaine 5 % Ptch  Commonly known as: Lidoderm     predniSONE 20 MG Tabs  Commonly known as: Deltasone               Where to Get Your Medications         These medications were sent to [place] [facility], [facility] [place], [place] - [address] [phone], [phone]  [address], [place] [address]      Phone: [phone]   cyclobenzaprine 10 MG Tabs  docusate sodium 100 MG Caps  HYDROcodone-acetaminophen 5-325 MG Tabs         FU   Follow-up Information       Sulma Nuñez MD Follow up in 2 week(s).    Specialty: Internal Medicine  Contact information:  [address]  [address]  [place] [address] [phone]               Pedro Romero MD Follow up in 1 week(s).    Specialties: SURGERY, ORTHOPEDIC, Surgery, Spine, Surgery, Orthopaedic  Why: Surgery is planned for next week  Contact information:  [address] [facility] [address] [phone]                             DC instructions:      I reconciled current and discharge medications on day of discharge, discussed changes with patient and noted changes above.       Total Time Coordinating Care: 35 minutes    Patient had opportunity to ask questions and state understand and agree with therapeutic plan as outlined    Thank You,    Zechariah Reid DO   Hospitalist with [facility]

## 2025-01-10 NOTE — PLAN OF CARE
Rishi is alert and ortiented x4. On room air. Voiding freely to the bathroom. Self ambulating. Received Norco for pain management. Plan on consultation with Dr. Romero today to determine plan of care. Call light and belongings in reach. Bed locked and in lowest position. Frequent rounding by nursing staff.     Problem: Patient Centered Care  Goal: Patient preferences are identified and integrated in the patient's plan of care  Description: Interventions:  - What would you like us to know as we care for you? I live at home with my wife and two kids 3 and 5 year old.  - Provide timely, complete, and accurate information to patient/family  - Incorporate patient and family knowledge, values, beliefs, and cultural backgrounds into the planning and delivery of care  - Encourage patient/family to participate in care and decision-making at the level they choose  - Honor patient and family perspectives and choices  Outcome: Progressing     Problem: Patient/Family Goals  Goal: Patient/Family Long Term Goal  Description: Patient's Long Term Goal:  to return home    Interventions:  -able to walk without pain  Pain is decreased or tolerable before discharge  Vitals/labs remain stable  - See additional Care Plan goals for specific interventions  Outcome: Progressing  Goal: Patient/Family Short Term Goal  Description: Patient's Short Term Goal: pain is decreased or resolved prior to discharge    Interventions:   - pain is managed with oral pain medication  Vital/labs remain stable  Able to perform ADLs without pain    - See additional Care Plan goals for specific interventions  Outcome: Progressing     Problem: PAIN - ADULT  Goal: Verbalizes/displays adequate comfort level or patient's stated pain goal  Description: INTERVENTIONS:  - Encourage pt to monitor pain and request assistance  - Assess pain using appropriate pain scale  - Administer analgesics based on type and severity of pain and evaluate response  - Implement  non-pharmacological measures as appropriate and evaluate response  - Consider cultural and social influences on pain and pain management  - Manage/alleviate anxiety  - Utilize distraction and/or relaxation techniques  - Monitor for opioid side effects  - Notify MD/LIP if interventions unsuccessful or patient reports new pain  - Anticipate increased pain with activity and pre-medicate as appropriate  Outcome: Progressing     Problem: RISK FOR INFECTION - ADULT  Goal: Absence of fever/infection during anticipated neutropenic period  Description: INTERVENTIONS  - Monitor WBC  - Administer growth factors as ordered  - Implement neutropenic guidelines  Outcome: Progressing     Problem: SAFETY ADULT - FALL  Goal: Free from fall injury  Description: INTERVENTIONS:  - Assess pt frequently for physical needs  - Identify cognitive and physical deficits and behaviors that affect risk of falls.  - [place] fall precautions as indicated by assessment.  - Educate pt/family on patient safety including physical limitations  - Instruct pt to call for assistance with activity based on assessment  - Modify environment to reduce risk of injury  - Provide assistive devices as appropriate  - Consider OT/PT consult to assist with strengthening/mobility  - Encourage toileting schedule  Outcome: Progressing     Problem: DISCHARGE PLANNING  Goal: Discharge to home or other facility with appropriate resources  Description: INTERVENTIONS:  - Identify barriers to discharge w/pt and caregiver  - Include patient/family/discharge partner in discharge planning  - Arrange for needed discharge resources and transportation as appropriate  - Identify discharge learning needs (meds, wound care, etc)  - Arrange for interpreters to assist at discharge as needed  - Consider post-discharge preferences of patient/family/discharge partner  - Complete POLST form as appropriate  - Assess patient's ability to be responsible for managing their own health  -  Refer to Case Management Department for coordinating discharge planning if the patient needs post-hospital services based on physician/LIP order or complex needs related to functional status, cognitive ability or social support system  Outcome: Progressing     Problem: METABOLIC/FLUID AND ELECTROLYTES - ADULT  Goal: Electrolytes maintained within normal limits  Description: INTERVENTIONS:  - Monitor labs and rhythm and assess patient for signs and symptoms of electrolyte imbalances  - Administer electrolyte replacement as ordered  - Monitor response to electrolyte replacements, including rhythm and repeat lab results as appropriate  - Fluid restriction as ordered  - Instruct patient on fluid and nutrition restrictions as appropriate  Outcome: Progressing     Problem: SKIN/TISSUE INTEGRITY - ADULT  Goal: Skin integrity remains intact  Description: INTERVENTIONS  - Assess and document risk factors for pressure ulcer development  - Assess and document skin integrity  - Monitor for areas of redness and/or skin breakdown  - Initiate interventions, skin care algorithm/standards of care as needed  Outcome: Progressing     Problem: MUSCULOSKELETAL - ADULT  Goal: Return mobility to safest level of function  Description: INTERVENTIONS:  - Assess patient stability and activity tolerance for standing, transferring and ambulating w/ or w/o assistive devices  - Assist with transfers and ambulation using safe patient handling equipment as needed  - Ensure adequate protection for wounds/incisions during mobilization  - Obtain PT/OT consults as needed  - Advance activity as appropriate  - Communicate ordered activity level and limitations with patient/family  Outcome: Progressing

## 2025-01-10 NOTE — PLAN OF CARE
Problem: Patient Centered Care  Goal: Patient preferences are identified and integrated in the patient's plan of care  Description: Interventions:  - What would you like us to know as we care for you? I live at home with my wife and two kids 3 and 5 year old.  - Provide timely, complete, and accurate information to patient/family  - Incorporate patient and family knowledge, values, beliefs, and cultural backgrounds into the planning and delivery of care  - Encourage patient/family to participate in care and decision-making at the level they choose  - Honor patient and family perspectives and choices  Outcome: Adequate for Discharge     Problem: Patient/Family Goals  Goal: Patient/Family Long Term Goal  Description: Patient's Long Term Goal: Will be able to regain strength on my left leg and will have pain controlled from there.    Interventions:  - Up as tolerated at home.  - Practice back safety.  Reposition every hour.  - No bending, heavy lifting nor twisting above waist area.  - Pain management with oral medication.  - See additional Care Plan goals for specific interventions  Outcome: Adequate for Discharge  Goal: Patient/Family Short Term Goal  Description: Patient's Short Term Goal: Pain managed with oral medication at home.    Interventions:   - Up as tolerated.    - Frequently change position.  - Pain management with both oral and IVP medications.  - Administer muscle relaxants as ordered.  - SCD's to both legs when in bed.  - Back for surgery on Friday per Dr. Romero.  - No bending, heavy lifting nor twisting above waist.  - PT/OT as ordered.  - See additional Care Plan goals for specific interventions  Outcome: Adequate for Discharge     Problem: PAIN - ADULT  Goal: Verbalizes/displays adequate comfort level or patient's stated pain goal  Description: INTERVENTIONS:  - Encourage pt to monitor pain and request assistance  - Assess pain using appropriate pain scale  - Administer analgesics based on type  and severity of pain and evaluate response  - Implement non-pharmacological measures as appropriate and evaluate response  - Consider cultural and social influences on pain and pain management  - Manage/alleviate anxiety  - Utilize distraction and/or relaxation techniques  - Monitor for opioid side effects  - Notify MD/LIP if interventions unsuccessful or patient reports new pain  - Anticipate increased pain with activity and pre-medicate as appropriate  Outcome: Adequate for Discharge     Problem: RISK FOR INFECTION - ADULT  Goal: Absence of fever/infection during anticipated neutropenic period  Description: INTERVENTIONS  - Monitor WBC  - Administer growth factors as ordered  - Implement neutropenic guidelines  Outcome: Adequate for Discharge     Problem: SAFETY ADULT - FALL  Goal: Free from fall injury  Description: INTERVENTIONS:  - Assess pt frequently for physical needs  - Identify cognitive and physical deficits and behaviors that affect risk of falls.  - [place] fall precautions as indicated by assessment.  - Educate pt/family on patient safety including physical limitations  - Instruct pt to call for assistance with activity based on assessment  - Modify environment to reduce risk of injury  - Provide assistive devices as appropriate  - Consider OT/PT consult to assist with strengthening/mobility  - Encourage toileting schedule  Outcome: Adequate for Discharge     Problem: DISCHARGE PLANNING  Goal: Discharge to home or other facility with appropriate resources  Description: INTERVENTIONS:  - Identify barriers to discharge w/pt and caregiver  - Include patient/family/discharge partner in discharge planning  - Arrange for needed discharge resources and transportation as appropriate  - Identify discharge learning needs (meds, wound care, etc)  - Arrange for interpreters to assist at discharge as needed  - Consider post-discharge preferences of patient/family/discharge partner  - Complete POLST form as  appropriate  - Assess patient's ability to be responsible for managing their own health  - Refer to Case Management Department for coordinating discharge planning if the patient needs post-hospital services based on physician/LIP order or complex needs related to functional status, cognitive ability or social support system  Outcome: Adequate for Discharge     Problem: METABOLIC/FLUID AND ELECTROLYTES - ADULT  Goal: Electrolytes maintained within normal limits  Description: INTERVENTIONS:  - Monitor labs and rhythm and assess patient for signs and symptoms of electrolyte imbalances  - Administer electrolyte replacement as ordered  - Monitor response to electrolyte replacements, including rhythm and repeat lab results as appropriate  - Fluid restriction as ordered  - Instruct patient on fluid and nutrition restrictions as appropriate  Outcome: Adequate for Discharge     Problem: SKIN/TISSUE INTEGRITY - ADULT  Goal: Skin integrity remains intact  Description: INTERVENTIONS  - Assess and document risk factors for pressure ulcer development  - Assess and document skin integrity  - Monitor for areas of redness and/or skin breakdown  - Initiate interventions, skin care algorithm/standards of care as needed  Outcome: Adequate for Discharge     Problem: MUSCULOSKELETAL - ADULT  Goal: Return mobility to safest level of function  Description: INTERVENTIONS:  - Assess patient stability and activity tolerance for standing, transferring and ambulating w/ or w/o assistive devices  - Assist with transfers and ambulation using safe patient handling equipment as needed  - Ensure adequate protection for wounds/incisions during mobilization  - Obtain PT/OT consults as needed  - Advance activity as appropriate  - Communicate ordered activity level and limitations with patient/family  Outcome: Adequate for Discharge    Patient is alert and oriented, aware to call for help as needed.  Patient is currently in room air, denies shortness of  breathing nor chest pain.  Patient is getting up ambulating independently, still with pain radiating on his left lower back to his left leg.  Patient is managed with oral medication.  Patient will be going home and will be coming back to have his surgery next Friday with Dr. Romero.

## 2025-01-14 NOTE — PAYOR COMM NOTE
Discharge Notification    Patient Name: PRIETO MEDRANO  Payor: [employer] [facility]  Subscriber #: [id number]  Authorization Number: [id number]  Admit Date/Time: 1/7/2025 12:58 PM  Discharge Date/Time: 1/10/2025 4:05 PM

## 2025-01-17 RX ORDER — OMEPRAZOLE 40 MG/1
40 CAPSULE, DELAYED RELEASE ORAL AS NEEDED
COMMUNITY
Start: 2024-11-06

## 2025-01-18 ENCOUNTER — LAB ENCOUNTER (OUTPATIENT)
Dept: LAB | Age: 35
End: 2025-01-18
Attending: ORTHOPAEDIC SURGERY
Payer: COMMERCIAL

## 2025-01-18 DIAGNOSIS — Z01.818 PRE-OP TESTING: ICD-10-CM

## 2025-01-18 LAB
ANTIBODY SCREEN: NEGATIVE
RH BLOOD TYPE: POSITIVE

## 2025-01-18 PROCEDURE — 86901 BLOOD TYPING SEROLOGIC RH(D): CPT

## 2025-01-18 PROCEDURE — 86900 BLOOD TYPING SEROLOGIC ABO: CPT

## 2025-01-18 PROCEDURE — 36415 COLL VENOUS BLD VENIPUNCTURE: CPT

## 2025-01-18 PROCEDURE — 86850 RBC ANTIBODY SCREEN: CPT

## 2025-01-22 ENCOUNTER — ANESTHESIA (OUTPATIENT)
Dept: SURGERY | Facility: HOSPITAL | Age: 35
End: 2025-01-22
Payer: COMMERCIAL

## 2025-01-22 ENCOUNTER — APPOINTMENT (OUTPATIENT)
Dept: GENERAL RADIOLOGY | Facility: HOSPITAL | Age: 35
End: 2025-01-22
Attending: ORTHOPAEDIC SURGERY
Payer: COMMERCIAL

## 2025-01-22 ENCOUNTER — HOSPITAL ENCOUNTER (INPATIENT)
Facility: HOSPITAL | Age: 35
LOS: 1 days | Discharge: HOME | End: 2025-01-23
Attending: ORTHOPAEDIC SURGERY | Admitting: ORTHOPAEDIC SURGERY
Payer: COMMERCIAL

## 2025-01-22 ENCOUNTER — ANESTHESIA EVENT (OUTPATIENT)
Dept: SURGERY | Facility: HOSPITAL | Age: 35
End: 2025-01-22
Payer: COMMERCIAL

## 2025-01-22 DIAGNOSIS — Z01.818 PRE-OP TESTING: Primary | ICD-10-CM

## 2025-01-22 DIAGNOSIS — Z98.1 S/P LUMBAR FUSION: ICD-10-CM

## 2025-01-22 LAB — RH BLOOD TYPE: POSITIVE

## 2025-01-22 PROCEDURE — 0SG30AJ FUSION OF LUMBOSACRAL JOINT WITH INTERBODY FUSION DEVICE, POSTERIOR APPROACH, ANTERIOR COLUMN, OPEN APPROACH: ICD-10-PCS | Performed by: ORTHOPAEDIC SURGERY

## 2025-01-22 PROCEDURE — 0SB40ZZ EXCISION OF LUMBOSACRAL DISC, OPEN APPROACH: ICD-10-PCS | Performed by: ORTHOPAEDIC SURGERY

## 2025-01-22 PROCEDURE — 3E0R3BZ INTRODUCTION OF ANESTHETIC AGENT INTO SPINAL CANAL, PERCUTANEOUS APPROACH: ICD-10-PCS | Performed by: STUDENT IN AN ORGANIZED HEALTH CARE EDUCATION/TRAINING PROGRAM

## 2025-01-22 PROCEDURE — 01NR0ZZ RELEASE SACRAL NERVE, OPEN APPROACH: ICD-10-PCS | Performed by: ORTHOPAEDIC SURGERY

## 2025-01-22 PROCEDURE — 4A11X4G MONITORING OF PERIPHERAL NERVOUS ELECTRICAL ACTIVITY, INTRAOPERATIVE, EXTERNAL APPROACH: ICD-10-PCS | Performed by: ORTHOPAEDIC SURGERY

## 2025-01-22 PROCEDURE — 01NB0ZZ RELEASE LUMBAR NERVE, OPEN APPROACH: ICD-10-PCS | Performed by: ORTHOPAEDIC SURGERY

## 2025-01-22 PROCEDURE — 76000 FLUOROSCOPY <1 HR PHYS/QHP: CPT | Performed by: ORTHOPAEDIC SURGERY

## 2025-01-22 PROCEDURE — 0SG3071 FUSION OF LUMBOSACRAL JOINT WITH AUTOLOGOUS TISSUE SUBSTITUTE, POSTERIOR APPROACH, POSTERIOR COLUMN, OPEN APPROACH: ICD-10-PCS | Performed by: ORTHOPAEDIC SURGERY

## 2025-01-22 DEVICE — GRAFT BNE SUB CUBE 15CC ALLGRFT CANC FD: Type: IMPLANTABLE DEVICE | Site: BACK | Status: FUNCTIONAL

## 2025-01-22 DEVICE — BONE GRAFT KIT 7510050 INFUSE XX SMALL
Type: IMPLANTABLE DEVICE | Site: BACK | Status: FUNCTIONAL
Brand: INFUSE® BONE GRAFT

## 2025-01-22 DEVICE — IMPLANTABLE DEVICE: Type: IMPLANTABLE DEVICE | Site: BACK | Status: FUNCTIONAL

## 2025-01-22 DEVICE — I-FACTOR PUTTY, 2.5CC SYRINGE
Type: IMPLANTABLE DEVICE | Site: BACK | Status: FUNCTIONAL
Brand: I-FACTOR PEPTIDE ENHANCED BONE GRAFT

## 2025-01-22 DEVICE — SET SCR SPNL T30 AST TECH: Type: IMPLANTABLE DEVICE | Site: BACK | Status: FUNCTIONAL

## 2025-01-22 RX ORDER — MORPHINE SULFATE 4 MG/ML
4 INJECTION, SOLUTION INTRAMUSCULAR; INTRAVENOUS EVERY 10 MIN PRN
Status: DISCONTINUED | OUTPATIENT
Start: 2025-01-22 | End: 2025-01-22 | Stop reason: HOSPADM

## 2025-01-22 RX ORDER — ACETAMINOPHEN 500 MG
500 TABLET ORAL EVERY 6 HOURS PRN
Status: DISCONTINUED | OUTPATIENT
Start: 2025-01-22 | End: 2025-01-23

## 2025-01-22 RX ORDER — SODIUM CHLORIDE, SODIUM LACTATE, POTASSIUM CHLORIDE, CALCIUM CHLORIDE 600; 310; 30; 20 MG/100ML; MG/100ML; MG/100ML; MG/100ML
INJECTION, SOLUTION INTRAVENOUS CONTINUOUS
Status: DISCONTINUED | OUTPATIENT
Start: 2025-01-22 | End: 2025-01-23

## 2025-01-22 RX ORDER — MORPHINE SULFATE 4 MG/ML
2 INJECTION, SOLUTION INTRAMUSCULAR; INTRAVENOUS EVERY 10 MIN PRN
Status: DISCONTINUED | OUTPATIENT
Start: 2025-01-22 | End: 2025-01-22 | Stop reason: HOSPADM

## 2025-01-22 RX ORDER — BUPROPION HYDROCHLORIDE 150 MG/1
150 TABLET, EXTENDED RELEASE ORAL 2 TIMES DAILY
Status: DISCONTINUED | OUTPATIENT
Start: 2025-01-22 | End: 2025-01-23

## 2025-01-22 RX ORDER — HYDROCODONE BITARTRATE AND ACETAMINOPHEN 10; 325 MG/1; MG/1
2 TABLET ORAL EVERY 4 HOURS PRN
Status: DISCONTINUED | OUTPATIENT
Start: 2025-01-22 | End: 2025-01-22

## 2025-01-22 RX ORDER — HYDROMORPHONE HYDROCHLORIDE 1 MG/ML
INJECTION, SOLUTION INTRAMUSCULAR; INTRAVENOUS; SUBCUTANEOUS AS NEEDED
Status: DISCONTINUED | OUTPATIENT
Start: 2025-01-22 | End: 2025-01-22 | Stop reason: SURG

## 2025-01-22 RX ORDER — CEFAZOLIN SODIUM IN 0.9 % NACL 3 G/100 ML
3 INTRAVENOUS SOLUTION, PIGGYBACK (ML) INTRAVENOUS ONCE
Status: COMPLETED | OUTPATIENT
Start: 2025-01-22 | End: 2025-01-22

## 2025-01-22 RX ORDER — ASCORBIC ACID 500 MG
1000 TABLET ORAL
Status: DISCONTINUED | OUTPATIENT
Start: 2025-01-22 | End: 2025-01-23

## 2025-01-22 RX ORDER — HYDROMORPHONE HYDROCHLORIDE 1 MG/ML
0.2 INJECTION, SOLUTION INTRAMUSCULAR; INTRAVENOUS; SUBCUTANEOUS EVERY 5 MIN PRN
Status: DISCONTINUED | OUTPATIENT
Start: 2025-01-22 | End: 2025-01-22 | Stop reason: HOSPADM

## 2025-01-22 RX ORDER — DOCUSATE SODIUM 100 MG/1
100 CAPSULE, LIQUID FILLED ORAL 2 TIMES DAILY
Status: DISCONTINUED | OUTPATIENT
Start: 2025-01-22 | End: 2025-01-23

## 2025-01-22 RX ORDER — TIZANIDINE 2 MG/1
2 TABLET ORAL ONCE
Status: COMPLETED | OUTPATIENT
Start: 2025-01-22 | End: 2025-01-22

## 2025-01-22 RX ORDER — GLYCOPYRROLATE 0.2 MG/ML
INJECTION, SOLUTION INTRAMUSCULAR; INTRAVENOUS AS NEEDED
Status: DISCONTINUED | OUTPATIENT
Start: 2025-01-22 | End: 2025-01-22 | Stop reason: SURG

## 2025-01-22 RX ORDER — PROCHLORPERAZINE EDISYLATE 5 MG/ML
5 INJECTION INTRAMUSCULAR; INTRAVENOUS EVERY 8 HOURS PRN
Status: DISCONTINUED | OUTPATIENT
Start: 2025-01-22 | End: 2025-01-23

## 2025-01-22 RX ORDER — HYDROMORPHONE HYDROCHLORIDE 1 MG/ML
0.6 INJECTION, SOLUTION INTRAMUSCULAR; INTRAVENOUS; SUBCUTANEOUS EVERY 5 MIN PRN
Status: DISCONTINUED | OUTPATIENT
Start: 2025-01-22 | End: 2025-01-22 | Stop reason: HOSPADM

## 2025-01-22 RX ORDER — BUPIVACAINE HYDROCHLORIDE AND EPINEPHRINE 5; 5 MG/ML; UG/ML
INJECTION, SOLUTION PERINEURAL AS NEEDED
Status: DISCONTINUED | OUTPATIENT
Start: 2025-01-22 | End: 2025-01-22 | Stop reason: HOSPADM

## 2025-01-22 RX ORDER — CYCLOBENZAPRINE HCL 10 MG
10 TABLET ORAL 3 TIMES DAILY PRN
Status: DISCONTINUED | OUTPATIENT
Start: 2025-01-22 | End: 2025-01-23

## 2025-01-22 RX ORDER — CALCITONIN SALMON 200 [IU]/.09ML
1 SPRAY, METERED NASAL DAILY
Qty: 1 EACH | Refills: 1 | Status: SHIPPED | OUTPATIENT
Start: 2025-01-22 | End: 2025-03-23

## 2025-01-22 RX ORDER — DIAZEPAM 5 MG/1
5 TABLET ORAL EVERY 6 HOURS PRN
Status: DISCONTINUED | OUTPATIENT
Start: 2025-01-22 | End: 2025-01-23

## 2025-01-22 RX ORDER — HYDROMORPHONE HYDROCHLORIDE 1 MG/ML
0.3 INJECTION, SOLUTION INTRAMUSCULAR; INTRAVENOUS; SUBCUTANEOUS
Status: DISCONTINUED | OUTPATIENT
Start: 2025-01-22 | End: 2025-01-23

## 2025-01-22 RX ORDER — SODIUM CHLORIDE, SODIUM LACTATE, POTASSIUM CHLORIDE, CALCIUM CHLORIDE 600; 310; 30; 20 MG/100ML; MG/100ML; MG/100ML; MG/100ML
INJECTION, SOLUTION INTRAVENOUS CONTINUOUS
Status: DISCONTINUED | OUTPATIENT
Start: 2025-01-22 | End: 2025-01-22 | Stop reason: HOSPADM

## 2025-01-22 RX ORDER — DEXAMETHASONE SODIUM PHOSPHATE 4 MG/ML
10 VIAL (ML) INJECTION ONCE
Status: COMPLETED | OUTPATIENT
Start: 2025-01-23 | End: 2025-01-23

## 2025-01-22 RX ORDER — CALCIUM CARBONATE 500 MG/1
500 TABLET, CHEWABLE ORAL EVERY 6 HOURS PRN
Status: DISCONTINUED | OUTPATIENT
Start: 2025-01-22 | End: 2025-01-23

## 2025-01-22 RX ORDER — POLYETHYLENE GLYCOL 3350 17 G/17G
17 POWDER, FOR SOLUTION ORAL DAILY
Status: DISCONTINUED | OUTPATIENT
Start: 2025-01-22 | End: 2025-01-23

## 2025-01-22 RX ORDER — MULTIVIT WITH MINERALS/LUTEIN
1000 TABLET ORAL 2 TIMES DAILY
Qty: 60 TABLET | Refills: 0 | Status: SHIPPED | OUTPATIENT
Start: 2025-01-22

## 2025-01-22 RX ORDER — PHENYLEPHRINE HCL 10 MG/ML
VIAL (ML) INJECTION AS NEEDED
Status: DISCONTINUED | OUTPATIENT
Start: 2025-01-22 | End: 2025-01-22 | Stop reason: SURG

## 2025-01-22 RX ORDER — SODIUM PHOSPHATE, DIBASIC AND SODIUM PHOSPHATE, MONOBASIC 7; 19 G/230ML; G/230ML
1 ENEMA RECTAL ONCE AS NEEDED
Status: DISCONTINUED | OUTPATIENT
Start: 2025-01-22 | End: 2025-01-23

## 2025-01-22 RX ORDER — DIPHENHYDRAMINE HCL 25 MG
25 CAPSULE ORAL EVERY 4 HOURS PRN
Status: DISCONTINUED | OUTPATIENT
Start: 2025-01-22 | End: 2025-01-23

## 2025-01-22 RX ORDER — NALOXONE HYDROCHLORIDE 0.4 MG/ML
0.08 INJECTION, SOLUTION INTRAMUSCULAR; INTRAVENOUS; SUBCUTANEOUS
Status: DISCONTINUED | OUTPATIENT
Start: 2025-01-22 | End: 2025-01-23

## 2025-01-22 RX ORDER — MORPHINE SULFATE 10 MG/ML
6 INJECTION, SOLUTION INTRAMUSCULAR; INTRAVENOUS EVERY 10 MIN PRN
Status: DISCONTINUED | OUTPATIENT
Start: 2025-01-22 | End: 2025-01-22 | Stop reason: HOSPADM

## 2025-01-22 RX ORDER — HYDROCODONE BITARTRATE AND ACETAMINOPHEN 10; 325 MG/1; MG/1
1 TABLET ORAL EVERY 4 HOURS PRN
Status: DISCONTINUED | OUTPATIENT
Start: 2025-01-22 | End: 2025-01-22

## 2025-01-22 RX ORDER — FAMOTIDINE 20 MG/1
20 TABLET, FILM COATED ORAL ONCE
Status: DISCONTINUED | OUTPATIENT
Start: 2025-01-22 | End: 2025-01-22 | Stop reason: HOSPADM

## 2025-01-22 RX ORDER — BISACODYL 10 MG
10 SUPPOSITORY, RECTAL RECTAL
Status: DISCONTINUED | OUTPATIENT
Start: 2025-01-22 | End: 2025-01-23

## 2025-01-22 RX ORDER — FAMOTIDINE 10 MG/ML
20 INJECTION, SOLUTION INTRAVENOUS ONCE
Status: DISCONTINUED | OUTPATIENT
Start: 2025-01-22 | End: 2025-01-22 | Stop reason: HOSPADM

## 2025-01-22 RX ORDER — DEXAMETHASONE SODIUM PHOSPHATE 4 MG/ML
VIAL (ML) INJECTION AS NEEDED
Status: DISCONTINUED | OUTPATIENT
Start: 2025-01-22 | End: 2025-01-22 | Stop reason: SURG

## 2025-01-22 RX ORDER — ACETAMINOPHEN 500 MG
1000 TABLET ORAL ONCE
Status: COMPLETED | OUTPATIENT
Start: 2025-01-22 | End: 2025-01-22

## 2025-01-22 RX ORDER — HYDROXYZINE HYDROCHLORIDE 25 MG/1
50 TABLET, FILM COATED ORAL EVERY 6 HOURS PRN
Status: DISCONTINUED | OUTPATIENT
Start: 2025-01-22 | End: 2025-01-23

## 2025-01-22 RX ORDER — HYDROCODONE BITARTRATE AND ACETAMINOPHEN 10; 325 MG/1; MG/1
TABLET ORAL
Qty: 40 TABLET | Refills: 0 | Status: SHIPPED | OUTPATIENT
Start: 2025-01-22 | End: 2025-01-29

## 2025-01-22 RX ORDER — ROCURONIUM BROMIDE 10 MG/ML
INJECTION, SOLUTION INTRAVENOUS AS NEEDED
Status: DISCONTINUED | OUTPATIENT
Start: 2025-01-22 | End: 2025-01-22 | Stop reason: SURG

## 2025-01-22 RX ORDER — MORPHINE SULFATE 1 MG/ML
INJECTION, SOLUTION EPIDURAL; INTRATHECAL; INTRAVENOUS AS NEEDED
Status: DISCONTINUED | OUTPATIENT
Start: 2025-01-22 | End: 2025-01-22 | Stop reason: HOSPADM

## 2025-01-22 RX ORDER — ONDANSETRON 2 MG/ML
4 INJECTION INTRAMUSCULAR; INTRAVENOUS EVERY 6 HOURS PRN
Status: DISCONTINUED | OUTPATIENT
Start: 2025-01-22 | End: 2025-01-23

## 2025-01-22 RX ORDER — DIPHENHYDRAMINE HYDROCHLORIDE 50 MG/ML
25 INJECTION INTRAMUSCULAR; INTRAVENOUS EVERY 4 HOURS PRN
Status: DISCONTINUED | OUTPATIENT
Start: 2025-01-22 | End: 2025-01-22

## 2025-01-22 RX ORDER — ONDANSETRON 2 MG/ML
INJECTION INTRAMUSCULAR; INTRAVENOUS AS NEEDED
Status: DISCONTINUED | OUTPATIENT
Start: 2025-01-22 | End: 2025-01-22 | Stop reason: SURG

## 2025-01-22 RX ORDER — HYDROMORPHONE HYDROCHLORIDE 1 MG/ML
0.4 INJECTION, SOLUTION INTRAMUSCULAR; INTRAVENOUS; SUBCUTANEOUS EVERY 5 MIN PRN
Status: DISCONTINUED | OUTPATIENT
Start: 2025-01-22 | End: 2025-01-22 | Stop reason: HOSPADM

## 2025-01-22 RX ORDER — PANTOPRAZOLE SODIUM 40 MG/1
40 TABLET, DELAYED RELEASE ORAL
Status: DISCONTINUED | OUTPATIENT
Start: 2025-01-23 | End: 2025-01-23

## 2025-01-22 RX ORDER — VANCOMYCIN HYDROCHLORIDE 1 G/20ML
INJECTION, POWDER, LYOPHILIZED, FOR SOLUTION INTRAVENOUS AS NEEDED
Status: DISCONTINUED | OUTPATIENT
Start: 2025-01-22 | End: 2025-01-22 | Stop reason: HOSPADM

## 2025-01-22 RX ORDER — OXYCODONE HYDROCHLORIDE 5 MG/1
10 TABLET ORAL ONCE
Status: COMPLETED | OUTPATIENT
Start: 2025-01-22 | End: 2025-01-22

## 2025-01-22 RX ORDER — SENNOSIDES 8.6 MG
17.2 TABLET ORAL NIGHTLY
Status: DISCONTINUED | OUTPATIENT
Start: 2025-01-22 | End: 2025-01-23

## 2025-01-22 RX ORDER — MAGNESIUM CARB/ALUMINUM HYDROX 105-160MG
296 TABLET,CHEWABLE ORAL ONCE AS NEEDED
Status: ACTIVE | OUTPATIENT
Start: 2025-01-22 | End: 2025-01-22

## 2025-01-22 RX ORDER — CEFAZOLIN SODIUM IN 0.9 % NACL 3 G/100 ML
3 INTRAVENOUS SOLUTION, PIGGYBACK (ML) INTRAVENOUS EVERY 8 HOURS
Status: COMPLETED | OUTPATIENT
Start: 2025-01-22 | End: 2025-01-23

## 2025-01-22 RX ORDER — HYDROCODONE BITARTRATE AND ACETAMINOPHEN 10; 325 MG/1; MG/1
2 TABLET ORAL EVERY 4 HOURS PRN
Status: DISCONTINUED | OUTPATIENT
Start: 2025-01-22 | End: 2025-01-23

## 2025-01-22 RX ORDER — NALOXONE HYDROCHLORIDE 0.4 MG/ML
0.08 INJECTION, SOLUTION INTRAMUSCULAR; INTRAVENOUS; SUBCUTANEOUS AS NEEDED
Status: DISCONTINUED | OUTPATIENT
Start: 2025-01-22 | End: 2025-01-22 | Stop reason: HOSPADM

## 2025-01-22 RX ORDER — HYDROCODONE BITARTRATE AND ACETAMINOPHEN 10; 325 MG/1; MG/1
1 TABLET ORAL EVERY 4 HOURS PRN
Status: DISCONTINUED | OUTPATIENT
Start: 2025-01-22 | End: 2025-01-23

## 2025-01-22 RX ORDER — MIDAZOLAM HYDROCHLORIDE 1 MG/ML
INJECTION INTRAMUSCULAR; INTRAVENOUS AS NEEDED
Status: DISCONTINUED | OUTPATIENT
Start: 2025-01-22 | End: 2025-01-22 | Stop reason: SURG

## 2025-01-22 RX ORDER — LIDOCAINE HYDROCHLORIDE 10 MG/ML
INJECTION, SOLUTION EPIDURAL; INFILTRATION; INTRACAUDAL; PERINEURAL AS NEEDED
Status: DISCONTINUED | OUTPATIENT
Start: 2025-01-22 | End: 2025-01-22 | Stop reason: SURG

## 2025-01-22 RX ORDER — METHOCARBAMOL 100 MG/ML
750 INJECTION, SOLUTION INTRAMUSCULAR; INTRAVENOUS ONCE AS NEEDED
Status: COMPLETED | OUTPATIENT
Start: 2025-01-22 | End: 2025-01-22

## 2025-01-22 RX ADMIN — LIDOCAINE HYDROCHLORIDE 50 MG: 10 INJECTION, SOLUTION EPIDURAL; INFILTRATION; INTRACAUDAL; PERINEURAL at 13:54:00

## 2025-01-22 RX ADMIN — DEXAMETHASONE SODIUM PHOSPHATE 8 MG: 4 MG/ML VIAL (ML) INJECTION at 15:10:00

## 2025-01-22 RX ADMIN — HYDROMORPHONE HYDROCHLORIDE 0.5 MG: 1 INJECTION, SOLUTION INTRAMUSCULAR; INTRAVENOUS; SUBCUTANEOUS at 14:42:00

## 2025-01-22 RX ADMIN — CEFAZOLIN SODIUM IN 0.9 % NACL 3 G: 3 G/100 ML INTRAVENOUS SOLUTION, PIGGYBACK (ML) INTRAVENOUS at 14:00:00

## 2025-01-22 RX ADMIN — PHENYLEPHRINE HCL 100 MCG: 10 MG/ML VIAL (ML) INJECTION at 15:25:00

## 2025-01-22 RX ADMIN — ROCURONIUM BROMIDE 50 MG: 10 INJECTION, SOLUTION INTRAVENOUS at 13:54:00

## 2025-01-22 RX ADMIN — PHENYLEPHRINE HCL 100 MCG: 10 MG/ML VIAL (ML) INJECTION at 15:07:00

## 2025-01-22 RX ADMIN — MIDAZOLAM HYDROCHLORIDE 2 MG: 1 INJECTION INTRAMUSCULAR; INTRAVENOUS at 13:46:00

## 2025-01-22 RX ADMIN — ONDANSETRON 4 MG: 2 INJECTION INTRAMUSCULAR; INTRAVENOUS at 16:56:00

## 2025-01-22 RX ADMIN — GLYCOPYRROLATE 0.2 MG: 0.2 INJECTION, SOLUTION INTRAMUSCULAR; INTRAVENOUS at 13:46:00

## 2025-01-22 NOTE — ANESTHESIA PREPROCEDURE EVALUATION
Anesthesia PreOp Note    HPI:     Oneil Wallace is a 34 year old male who presents for preoperative consultation requested by: Pedro Romero MD    Date of Surgery: 1/22/2025    Procedure(s):  Left Lumbar 5 - Sacral 1 minimally invasive transforaminal lumbar interbody fusion, instrumentation, cages, allograft  Indication: Herniated nucleus pulposus and spondylolisthesis Lumbar 5 - Sacral 1    Relevant Problems   No relevant active problems       NPO:  Last Liquid Consumption Date: 01/21/25  Last Liquid Consumption Time: 2300  Last Solid Consumption Date: 01/21/25  Last Solid Consumption Time: 1900  Last Liquid Consumption Date: 01/21/25          History Review:  Patient Active Problem List    Diagnosis Date Noted    Acute on chronic low back pain 01/07/2025    Lumbar radiculopathy 01/07/2025    GERD (gastroesophageal reflux disease)     Knee pain 07/15/2013    Unspecified internal derangement of knee 01/27/2012       Past Medical History:    Back problem    Depression    GERD (gastroesophageal reflux disease)    OTHER DISEASES    NONE    Visual impairment    glasses       Past Surgical History:   Procedure Laterality Date    Other surgical history      L-knee surgery-Arthroscopic       Prescriptions Prior to Admission[1]  Current Medications and Prescriptions Ordered in Epic[2]    Allergies[3]    Family History   Problem Relation Age of Onset    Hypertension Father     Other (Other) Other      Social History     Socioeconomic History    Marital status:    Tobacco Use    Smoking status: Every Day     Types: Cigarettes    Smokeless tobacco: Never    Tobacco comments:     1 pack a week, maybe less   Vaping Use    Vaping status: Never Used   Substance and Sexual Activity    Alcohol use: Yes     Comment: socially    Drug use: Yes     Frequency: 7.0 times per week     Types: Cannabis       Available pre-op labs reviewed.  Lab Results   Component Value Date    WBC 7.8 01/10/2025    RBC 5.35 01/10/2025    HGB  16.1 01/10/2025    HCT 48.7 01/10/2025    MCV 91.0 01/10/2025    MCH 30.1 01/10/2025    MCHC 33.1 01/10/2025    RDW 12.3 01/10/2025    .0 01/10/2025     Lab Results   Component Value Date     01/10/2025    K 4.7 01/10/2025     01/10/2025    CO2 33.0 (H) 01/10/2025    BUN 15 01/10/2025    CREATSERUM 1.22 01/10/2025    GLU 96 01/10/2025    CA 10.0 01/10/2025          Vital Signs:  Body mass index is 41.82 kg/m².   height is 1.854 m (6' 1\") and weight is 143.8 kg (317 lb) (abnormal). His oral temperature is 98.2 °F (36.8 °C). His blood pressure is 134/73 and his pulse is 85. His respiration is 18 and oxygen saturation is 93%.   Vitals:    01/17/25 1131 01/22/25 1153   BP:  134/73   Pulse:  85   Resp:  18   Temp:  98.2 °F (36.8 °C)   TempSrc:  Oral   SpO2:  93%   Weight: (!) 142.9 kg (315 lb) (!) 143.8 kg (317 lb)   Height: 1.854 m (6' 1\") 1.854 m (6' 1\")        Anesthesia Evaluation     Patient summary reviewed and Nursing notes reviewed    Airway   Mallampati: III  TM distance: >3 FB  Neck ROM: limited  Dental      Pulmonary    Cardiovascular     Rhythm: regular    Neuro/Psych    (+)  neuromuscular disease,  depression      GI/Hepatic/Renal    (+) GERD    Endo/Other      Comments: Morbid Obesity  Abdominal                  Anesthesia Plan:   ASA:  3  Plan:   General  Airway:  ETT  Informed Consent Plan and Risks Discussed With:  Patient  Discussed plan with:  Attending      I have informed Oneil Wallace and/or legal guardian or family member of the nature of the anesthetic plan, benefits, risks including possible dental damage if relevant, major complications, and any alternative forms of anesthetic management.   All of the patient's questions were answered to the best of my ability. The patient desires the anesthetic management as planned.  Xi Arrington MD  1/22/2025 12:33 PM  Present on Admission:  **None**           [1]   Medications Prior to Admission   Medication Sig Dispense Refill Last  Dose/Taking    Omeprazole 40 MG Oral Capsule Delayed Release Take 1 capsule (40 mg total) by mouth as needed.   1/22/2025 at  8:00 AM    cyclobenzaprine 10 MG Oral Tab Take 1 tablet (10 mg total) by mouth 3 (three) times daily as needed for Muscle spasms. 30 tablet 0 1/21/2025 at 11:00 PM    HYDROcodone-acetaminophen 5-325 MG Oral Tab Take 2 tablets by mouth every 6 (six) hours as needed. 56 tablet 0 1/21/2025 at 11:00 PM    docusate sodium 100 MG Oral Cap Take 100 mg by mouth 2 (two) times daily. 60 capsule 0 1/21/2025 at 11:00 PM    buPROPion  MG Oral Tablet 12 Hr Take 1 tablet (150 mg total) by mouth 2 (two) times daily.   1/22/2025 at  8:00 AM    gabapentin 300 MG Oral Cap Take 1 capsule (300 mg total) by mouth 3 (three) times daily.   1/22/2025 at  8:00 AM   [2]   Current Facility-Administered Medications Ordered in Epic   Medication Dose Route Frequency Provider Last Rate Last Admin    lactated ringers infusion   Intravenous Continuous Pedro Romero MD 20 mL/hr at 01/22/25 1152 New Bag at 01/22/25 1152    ceFAZolin (Ancef) 3 g in sodium chloride 0.9% 100mL IVPB premix  3 g Intravenous Once Pedro Romero MD         No current [facility]-ordered outpatient medications on file.   [3] No Known Allergies

## 2025-01-22 NOTE — CONSULTS
[facility] Consult Note/ HISTORY AND PHYSICAL     CC: post op medical management       PCP: Sulma Nuñez MD    History of Present Illness:   Patient is a 34 year old male with PMH sig for lumbar DJD w LE radiculopathy sp Posterolateral fusion, posterior lumbar interbody fusion L5-S1. Seen in the PACU reports his pain is well controlled on current regimen - he had experienced severe low back radiating to LLE pain without relief with non surgical management including medication LESI, PT. His chronic medical problems include GERD- improved req rare use PPI, seasonal affective disorder controlled on wellbutrin, visual impairment managed with corrective lenses.       Past Medical History:    Back problem    Depression    GERD (gastroesophageal reflux disease)    OTHER DISEASES    NONE    Visual impairment    glasses      Past Surgical History:   Procedure Laterality Date    Other surgical history      L-knee surgery-Arthroscopic        ALL:  Allergies[1]          Social History     Tobacco Use    Smoking status: Every Day     Types: Cigarettes    Smokeless tobacco: Never    Tobacco comments:     1 pack a week, maybe less   Substance Use Topics    Alcohol use: Yes     Comment: socially        Fam Hx  Family History   Problem Relation Age of Onset    Hypertension Father     Other (Other) Other        Review of Systems  10 point Comprehensive ROS reviewed and negative except for what's stated above.     OBJECTIVE:  /79   Pulse 104   Temp 98.2 °F (36.8 °C) (Oral)   Resp 17   Ht 6' 1\" (1.854 m)   Wt (!) 317 lb (143.8 kg)   SpO2 95%   BMI 41.82 kg/m²     GEN: NAD, AAO  NECK: supple, no LAD  HEENT- sclera anti-icteric, OP- MMM  CV: rrr, +s1/s2, PMI non displaced  LUNGS: CTAB, normal resp effort  ABL soft, NT/ND, NABS, no HSC  EXT: no LE edema b/l , DP pulses 2+ b/l  Neuro: sensation intact, no focal deficits  SKIN- no rashes, no lesion  PSYCH- normal mentation, normal affect      LABS: CBC and BMP in AM        Radiology: XR FLUOROSCOPY C-ARM TIME LESS THAN 1 HOUR (CPT=76000)    Result Date: 1/22/2025  PROCEDURE: XR FLUORO PHYSICIAN TIME< 1 HOUR (CPT=76000)  COMPARISON: [facility], MRI SPINE LUMBAR (CPT=72148), 1/07/2025, 8:39 PM.  [facility], CT SPINE LUMBAR (CPT=72131), 1/09/2025, 12:29 PM.  INDICATIONS: Left Lumbar 5 - Sacral 1 minimally invasive transforaminal lumbar interbody fusion, instrumentation, cages, allograft  TECHNIQUE:   FLUOROSCOPY IMAGES OBTAINED:  3 FLUOROSCOPY TIME:  2 minutes 20.6 seconds RADIATION DOSE (Dose Area Product):  2.85-mGy*m^2  FINDINGS:   Fluoroscopic guidance was utilized for posterior instrumentation and fusion at L5-S1.  Please refer to the operative report for further details.         CONCLUSION: See above    Dictated by (CST): Sumanth Zavala MD on 1/22/2025 at 5:29 PM     Finalized by (CST): Sumanth Zavala MD on 1/22/2025 at 5:30 PM          CT SPINE LUMBAR (CPT=72131)    Result Date: 1/9/2025  PROCEDURE: CT SPINE LUMBAR (CPT=72131)  COMPARISON: [facility], MRI SPINE LUMBAR (CPT=72148), 1/07/2025, 8:39 PM.  INDICATIONS: Lower back pain, left leg pain,  TECHNIQUE:   Multi-planar CT images were obtained without intravenous contrast material.  Automated exposure control for dose reduction was used. Adjustment of the mA and/or kV was done based on the patient's size. Use of iterative reconstruction technique for dose reduction was used.  Dose information is transmitted to the ACR (American College of Radiology) NRDR (National Radiology Data Registry) which includes the Dose Index Registry.  FINDINGS:  NUMERATION: For the purposes of this examination, the lowest fully formed disc space is designated as L5-S1. ALIGNMENT:   The anatomic lumbar lordosis is preserved. There is grade I anterolisthesis of L5 relative to S1, related to bilateral L5-S1 spondylolysis; slight dextroscoliosis. VERTEBRAL BODIES:   A total of 5 non-rib-bearing  lumbar-type vertebral bodies are identified. No fracture, subluxation, or bony lesion is visible. PARASPINAL AREA: Partially imaged lower thoracic spine degenerative changes with a disc bulge at T11-T12 as well as ligamentum flavum redundancy and thickening at T11-T12 and T12-L1. SACROILIAC JOINTS: Unremarkable.   LUMBAR DISC LEVELS: L1-L2: No significant disc/facet abnormality, spinal stenosis, or foraminal stenosis.  L2-L3: No significant disc/facet abnormality, spinal stenosis, or foraminal stenosis.  L3-L4: Mild disc desiccation and degeneration.  No significant spinal canal or neural foraminal stenosis. L4-L5: Diffuse disc bulge with superimposed right paracentral/subarticular zone disc protrusion in addition to minor bilateral facet arthropathy.  Mild right lateral recess stenosis with minor bilateral neural foraminal stenosis, but no spinal canal compromise. L5-S1: Left eccentric disc bulge with left greater than right facet arthropathy.  Severe left and mild-to-moderate right neural foraminal stenosis with no spinal canal compromise.  OTHER: Negative.          CONCLUSION:   1. L5-S1:  Severe left and mild-to-moderate right neural foraminal stenosis. 2. L4-L5:  Mild right lateral recess with minor bilateral neural foraminal stenosis.  3. Grade I anterolisthesis of L5 relative to S1, related to bilateral L5-S1 spondylolysis.  4. Slight dextroscoliosis of the lumbar spine.  5. Minor but advanced for patient age left iliac artery atherosclerosis.   elm-remote  Dictated by (CST): Reese Hart MD on 1/09/2025 at 12:56 PM     Finalized by (CST): Reese Hart MD on 1/09/2025 at 1:01 PM          MRI SPINE LUMBAR (CPT=72148)    Result Date: 1/7/2025  PROCEDURE: MRI SPINE LUMBAR (CPT=72148)4  COMPARISON: None.  INDICATIONS: lumbar radiculopathy s/p LESI  TECHNIQUE: A variety of imaging planes and parameters were utilized for visualization of suspected pathology.  FINDINGS: Evaluation is slightly degraded by  patient-related motion artifact.    PARASPINAL AREA: Normal with no visible mass.  BONES: T12 vertebral hemangioma.  No suspicious bone lesion or acute fracture.  Vertebral bodies are intact. CORD/CAUDA EQUINA: Normal caliber, contour, and signal intensity.  OTHER: Mild degenerative disc disease at T11-12 and T12-L1 without significant stenosis.  LUMBAR DISC LEVELS: L1-L2: No significant disc/facet abnormality, spinal stenosis, or foraminal stenosis.  L2-L3: No significant disc/facet abnormality, spinal stenosis, or foraminal stenosis.  L3-L4: No significant disc/facet abnormality, spinal stenosis, or foraminal stenosis.  L4-L5: Minimal retrolisthesis.  Decrease in disc height with a spondylotic disc bulging and a superimposed right paracentral disc protrusion exaggerated by the retrolisthesis.  Mild peripheral narrowing.  No significant central narrowing or high-grade peripheral narrowing. L5-S1: Bilateral L5 pars defects with grade 1 spondylolisthesis of L5 on S1.  Disc degeneration with a left foraminal disc extrusion which results in severe foraminal narrowing and impingement on the left L5 nerve root.  Mild right foraminal narrowing.  Prominence of epidural fat attenuates the caudal thecal sac.         CONCLUSION:  1. L5-S1:  Grade 1 spondylolisthesis related to bilateral L5 pars defects.  Disc degeneration with large left foraminal disc herniation impinging on the left L5 nerve root.  Mild right foraminal narrowing. 2. L4-5:  Minimal retrolisthesis.  Disc degeneration with broad-based right paracentral/central disc protrusion.  Mild peripheral narrowing.  No high-grade stenosis.    Dictated by (CST): Andrei Rosales MD on 1/07/2025 at 9:45 PM     Finalized by (CST): Andrei Rosales MD on 1/07/2025 at 9:53 PM               ASSESSMENT / PLAN:  34 year old male with PMH sig for lumbar DJD w LE radiculopathy sp Posterolateral fusion, posterior lumbar interbody fusion L5-S    Lumbar DJD/ Spinal stenosis w  radiculpathy sp Left Lumbar 5 - Sacral 1 minimally invasive transforaminal lumbar interbody fusion, instrumentation, cages, allograft (Left: Spine Lumbar)   - post op per spine  - PT, OT  - pain control- ivp dilaudid prn > po as able   - reports noted snoring but never tested for geeta- will do cont pulse ox and tele while on iv narcotics  - CBC, BMP in am    Seasonal affective disorder  - wellbutrin    GERD  - managed w/o meds    Class 3 obesity BMI 41.82  - weight loss      Outpatient records or previous hospital records reviewed as detailed above.    [facility] hospitalist to continue to follow patient while in house      [facility] Hospitalist  Pager [phone]    1/22/2025  5:53 PM         [1] No Known Allergies

## 2025-01-22 NOTE — OPERATIVE REPORT
Pre-postop dx:  L5-s1 isthmic spondylolisthesis, with HNP  Proc:  left L5-s1 MIS TLIF, [employer] inst. And cages, allograft, local graft, intrathecal block  Heather/Kathy  GETA  Ebl: 30 ml  Drains: none  Complications: none  To RR stable  #[id number]

## 2025-01-22 NOTE — DISCHARGE INSTRUCTIONS
Dr. Romero - LUMBAR SPINE SURGERY     Incision:  Ok to get wet in a shower and gently wash with soap and water two days after surgery.  Change your dressing daily.  When there is no drainage for two days in a row, you may discontinue the dressing.  Walk as much as possible after surgery.  No bending, lifting more than 10#, or twisting for 12 weeks.  Wear your back brace when out of bed.   Take vitamin C 1000 mg twice daily with food for 4 weeks.  Take vitamin D 50 mcg once daily for 8 weeks.  Take calcitonin nasal spray once daily for 8 weeks.  Alternate nostrils each day.  Take pain medication (Norco) as needed for pain, wean down as you are able.  Take cyclobenzaprine (Flexeril) for muscle spasms/pain as needed.  Use miralax and colace (over the counter medications) to prevent constipation associated with the narcotic pain meds.  Do not take anti-inflammatories (Advil, Motrin, Aleve, ibuprofen) for 8 weeks after surgery.    Avoid nicotine in all forms as it may interfere with healing of the fusion.  Call Dr. Romero's office with any concerns:  [phone].

## 2025-01-22 NOTE — H&P
CHIEF COMPLAINT: Left leg pain, weakness, numbness.    HISTORY OF PRESENT ILLNESS: This is a 34-year-old male who has had low back pain, primarily left-sided since April 2024. He underwent 2 months of physical therapy through his chiropractor at [facility]. He did not improve. He then started getting more aggressive with the physical therapy in summer 2024, and went through additional therapy at [facility], 3 times per week. The pain did not improve. Then an MRI was ordered that showed a far lateral herniated disk at L5-S1 and L5 spondylolysis. He underwent injection, which did not improve and actually made the pain worse rather than better. It intensified the physical therapy, but, again, it did not improve. He has been using a walker for the last several weeks and has not worked since 12/30/2024, because of severe left leg pain. He was hospitalized at [facility] for 2 days. Once the pain was under control with Flexeril, gabapentin, and hydrocodone every 6 hours, he was able to be discharged to home to wait for us to schedule surgery. I had done a telemedicine visit with him as the MRI shows an L5 spondylolysis, spondylolisthesis at L5-S1, and a far lateral herniated disk at L5-S1. He has some degenerative disk disease at L4-5, but without any root compression at that level.    REVIEW OF SYSTEMS: Negative.    SOCIAL HISTORY: He is . He comes in today with his wife. He does not smoke.    PAST MEDICAL HISTORY: History of morbid obesity. The patient is 6 feet 1 inch and 315 pounds.    PHYSICAL EXAMINATION: He is ambulating with the use of a rolling walker. He has a positive SLR on the left at 40 degrees causing radicular pain down the left leg. He has a moderate drop foot on the left. He has decreased sensation in the left L5 dermatome. He has 2+ reflexes of both knees and both ankles. No pain with hip or knee range of motion. No SI joint findings.    IMAGING: MRI from [facility] shows  Imatrex ordered greater than an hour ago with note sent that has been unread at this time, spoke to pharmacy and they stated they would tube shortly.   a far lateral disk herniation at L5-S1 markedly compressing the L5 nerve root. He also has L5 spondylolysis and spondylolisthesis at L5-S1.    ASSESSMENT: Left L5 radiculopathy secondary to far lateral disk herniation.    PLAN: The plan is for a left L5-S1 MIS TLIF. Due to the left drop foot, he is not a candidate for further conservative treatment. He is taking Norco 5/325 mg 2 tablets every 6 hours. I told him that since he has been doing this for the last several weeks, likely pain control will be the biggest issue after surgery. Risks of persistent back or leg symptoms, bleeding, infection, nonunion, degeneration of the level above, DVT, PE, and anesthetic risks were all explained. The patient understands and would like to proceed. I told him he needs to lose weight after this surgery and the goal would be that he should probably lose close to 100 pounds. All questions were answered. He understands that he will be in a brace for 10 weeks postop and no heavy lifting during that time.   Oneil Wallace is a 34 year old male with a hx of smoking anxiety and lumbar radiculapthy , who presents for a pre-operative physical exam. Patient is to have L5-S1 - minimally invasive lumbar fusion , to by done by Dr. Joaquin at [facility] [place] on 12/10/24 .     Patient started with pain going down left leg - in July 2024 . Started seeing chiropractor and getting physical therapy- pain persisted and got worse . Was taking aleve twice daily . Then had MRI thru chiropractor 11/14/24 -. Said to see surgeon . Saw early December - he said to see pain management     Saw Doctor rey - obtained copy of MRI=- \" severe left foraminal stenosis (narrowing) at L5-S1. I do not recall reading this in your report. As such, I would redirect the injection to the left L5 foramen via a transforaminal epidural. \"    Had 12/19 LESI- LEFT L5 TRANSFORAMINAL EPIDURAL STEROID INJECTION UNDER FLUOROSCOPY   Lasted 2 days  Pain returned terrible   Given  norco and gabapentin   By 12/29 Pain severe constant - couldn't walk or sit Left calf   Then to ER 12/31 - got pain shot and sent home -toradol lidocaine and valium   Saw Doctor rey 1/2/25 - given norco and prednisone taper 60--> 40--> 20 ,   Started flexaril     Patient pain increased. laying on floor most of time . Hasn't been able to work. Has to use a walker to walk to get to bathroom and around house Only way to get pain relief . Spasms getting terrible  Presented to [facility]   Admitted 1/7/25-1/10/25  MRI spine / ct spine done   Ct spine   CONCLUSION: 1. L5-S1: Severe left and mild-to-moderate right neural foraminal stenosis. 2. L4-L5: Mild right lateral recess with minor bilateral neural foraminal stenosis. 3. Grade I anterolisthesis of L5 relative to S1, related to bilateral L5-S1 spondylolysis. 4. Slight dextroscoliosis of the lumbar spine. 5. Minor but advanced for patient age left iliac artery atherosclerosis.   MRI spine   L5 S1-Grade 1 spondylolisthesis related to bilateral L5 pars defects. Disc degeneration with large left foraminal disc herniation impinging on the left L5 nerve root. Mild right foraminal narrowing. 2. L4-5: Minimal retrolisthesis. Disc degeneration with broad-based right paracentral/central disc protrusion. Mild peripheral narrowing. No high-grade stenosis   Seen by spine surgery who reccommend surgery   Taking norco 1 5 mg tablets every 6 hrs     Saw Doctor Romero yesterday   \": MRI from [facility] shows a far lateral disk herniation at L5-S1 markedly compressing the L5 nerve root. He also has L5 spondylolysis and spondylolisthesis at L5-S1. \"   Exam   \"He is ambulating with the use of a rolling walker. He has a positive SLR on the left at 40 degrees causing radicular pain down the left leg. He has a moderate drop foot on the left. He has decreased sensation in the left L5 dermatome. \"    Surgery scheduled     No chest pain   Physical job    No sleep apnea    No  issue with anesthesia in past -had meniscectomy    Current Outpatient Medications   Medication Sig Dispense Refill   cyclobenzaprine 10 MG Oral Tab Take 1 tablet by mouth 3 (three) times daily as needed.   gabapentin 300 MG Oral Cap Take 1 capsule (300 mg total) by mouth 3 (three) times daily. 90 capsule 0   propranolol 10 MG Oral Tab Take 1 tablet (10 mg total) by mouth 3 (three) times daily as needed. 60 tablet 0   Omeprazole 40 MG Oral Capsule Delayed Release Take 1 capsule (40 mg total) by mouth daily. Before meal 30 capsule 1   buPROPion  MG Oral Tablet 12 Hr Take 1 tablet (150 mg total) by mouth 2 (two) times daily. 180 tablet 0     Past Medical History:   Diagnosis Date   GERD (gastroesophageal reflux disease)   OTHER DISEASES   NONE     Past Surgical History:   Procedure Laterality Date   OTHER SURGICAL HISTORY   L-knee surgery-Arthroscopic     Family History   Problem Relation Age of Onset   Other (Other) Other     Social History  Tobacco Use  Smoking status: Every Day  Types: Cigarettes  Smokeless tobacco: Never  Tobacco comments: 1 pack a week, maybe less  Alcohol use: Yes  Comment: socially    REVIEW OF SYSTEMS:  GENERAL: No fever, chills   SKIN: denies any unusual skin lesions  EYES:denies blurred vision or double vision  HEENT: denies nasal congestion, sinus pain or pharyngitis  LUNGS: denies shortness of breath with exertion. No orthopnea, cough or wheezing  CARDIOVASCULAR: denies chest pain, palpitations  GI: denies abdominal pain,denies heartburn, diarrhea or constipation  : denies dysuria, denies frequency or urgency  MUSCULOSKELETAL: per HPI   NEURO: denies headache, dizziness or other focal neuro complaints  PSYCHE: denies depression or anxiety  HEMATOLOGIC: denies hx of anemia, no bruising or bleeding  ENDOCRINE: denies history of diabetes or thyroid disease  ALL/ASTHMA: denies hx of allergy or asthma    EXAM:  /80 (BP Location: Left arm, Patient Position: Other (Comment))  Pulse  100  Temp 96.7 °F (35.9 °C) (Temporal)  Ht 6' 1.5\" (1.867 m)  Wt (!) 318 lb 9.6 oz (144.5 kg)  SpO2 97%  BMI 41.46 kg/m²   GENERAL: well developed, well nourished NAD  SKIN: no rashes, no suspicious lesions  HEENT: atraumatic, normocephalic,ears and throat are clear  EYES:PERRLA, EOMI, ,conjunctiva are clear  NECK: supple no bruits, nodes or masses  CHEST: no chest tenderness  LUNGS: clear to auscultation bilateral  CARDIO: S1, S2 no murmur, rub or gallop  GI: abdomen soft, non tender, no organomegaly or masses  MUSCULOSKELETAL: patient laying down on table unable to sit for any length of time. Unable   EXTREMITIES: no cyanosis, clubbing or edema  NEURO: Non focal. Motor strength and sensation are normal. Alert and oriented     ASSESSMENT AND PLAN:    Diagnoses and all orders for this visit:    Preop exam for internal medicine/Lumbar radiculopathy/ Displacement of lumbar intervertebral disc with radiculopathy/ Spinal stenosis of lumbar region with neurogenic claudication- patient with severe radicular back pain with foot drop and decreased sensation in l5 dermatome on exam . Pain not controlled with pain medicines. Patient failed LESI . Ct shows severe stenosis MRI shows disk herniation . Patient failed conservative management and has signs of neurologic compromise on exam   Proceed with procedure  Ekg - done nsr   Preop labs- done   Stop supplements 7 days before surgery  No nsaids ( including advil, ibuprofen, motrin ,aleve ) 7 days before surgery   Can use norco     Pre-op testing  - ECG ROUTINE ECG W/LEAST 12 LDS W/I&R  - MRSA / MSSA PRE-OP; Future    Smoker-   stop smoking cigarettes and canabis before surgery

## 2025-01-22 NOTE — OPERATIVE REPORT
[facility]    PATIENT'S NAME: MEIR MEDRANO   ATTENDING PHYSICIAN: Pedro Romero MD   OPERATING PHYSICIAN: Pedro Romero MD   PATIENT ACCOUNT#:   [id number]    LOCATION:  Critical access hospital PACU [address] [address]  MEDICAL RECORD #:   [id number]       YOB: 1990  ADMISSION DATE:       01/22/2025      OPERATION DATE:  01/22/2025    OPERATIVE REPORT      PREOPERATIVE DIAGNOSIS:  L5-S1 isthmic spondylolisthesis with herniated disc, far lateral L5-S1 foramen.  POSTOPERATIVE DIAGNOSIS:  L5-S1 isthmic spondylolisthesis with herniated disc, far lateral L5-S1 foramen.  PROCEDURE:    1.   Posterolateral fusion, posterior lumbar interbody fusion L5-S1 (16922).  2.   Decompression of nerve roots, removal of disc herniation, and far lateral foraminal decompression (in addition to corridor needed for TLIF, 99366).  3.   SpineCraft Narciso pedicle screw system L5-S1 (14668).  4.   Use of interbody cage, L5-S1 (60583).  5.   Local bone graft (92830).     ASSISTANT:  Roxy Chavez PA-C (medically necessary due to high level of complex medical procedure).    ANESTHESIA:  General endotracheal.    ESTIMATED BLOOD LOSS:  30 mL.    DRAINS:  None.    COMPLICATIONS:  None.    INDICATIONS:  This is a 34-year-old male with a BMI of 42 who has had severe left leg pain for the last 6 weeks.  He has failed epidural steroid injections and physical therapy.  Symptoms have been progressive over the last year.  However, at this point, he is bedridden and he has a drop foot on the left.  MRI and CT showed a large foraminal disc herniation at L5-S1 compressing both the L5 and S1 nerve roots and isthmic spondylolysis of L5 with a 6 mm slip at L5-S1.  I have recommended decompression and fusion.  Risks, benefits, and alternatives explained to the patient in detail.  He appears to understand and has elected to proceed.    OPERATIVE TECHNIQUE:  The patient was given uncomplicated general endotracheal anesthesia and placed prone on the  Murali frame.  The face and extremities were well padded.  The back was prepped and draped in usual fashion.  He was given preoperative antibiotics and IV steroids.    Localizing x-ray was taken with C-arm.  Two 1-1/2 inch incisions were made each approximately 3.5 to 4 cm lateral to midline for standard Fatmata approach to the L5-S1 facet joints.  Jamshidi needles were then used to cannulate the pedicles of L5 and S1.  It should be noted that the L5 pedicles were extremely small in this very large individual.  We were able to cannulate them using multi image fluoroscopy.  Each pedicle was then tapped with triggered EMGs over a K-wire and all were within threshold limits.  The Panorama retractor was then back on the left L5-S1 facet joint and microscope brought in.    Soft tissue was cleared off the left L5-S1 facet joint which was removed partially with an osteotome and partially with a Kerrison rongeur.  Local bone graft was saved and placed in the right posterolateral gutter for posterolateral fusion.  We then thinned down the rest of the remaining lamina with a high-speed drill.  A Kerrison rongeur was used to enter the spinal canal distally.  We removed the entire ligamentum flavum on the left side to fully decompress the left S1 and L5 roots dorsally.  As we were doing this, an extremely large herniated disc between the L5 and S1 roots was seen and removed with a pituitary rongeur.  There were several fragments also that went out far lateral into the foramen.  Decompression was far wider than would be needed for ordinary TLIF due to the extensive nature of the stenosis and herniation.    After the nerve roots were free and clear, the disc space was incised with a #15 blade and the disc entered with a shaver.  We then placed SpineCraft Narciso pedicle screws on the right side at L5 and S1, checked them with triggered EMGs, and connected with a kuldeep reducing the slip.  We then completed the interbody preparation  down to good endplate bone and pierced the endplates with an awl.  We then placed 2 SpineCraft 11 x 24 mm interbody cages into the disc space, spread them apart and put 1 cm bone along with an extra, extra-small sponge of Infuse into the interbody area.  Fit was excellent, and we recessed the cages by approximately 4 mm.  An intrathecal morphine and fentanyl block was performed with a 27-gauge Sprotte needle, 0.5 mg morphine and 25 mcg fentanyl for postoperative analgesia.  All nerve roots were free and clear.  In particular, we checked under the left L5 ganglia, and there was no further compression and no other fragments of disc above or below the far lateral annulus.  Dura was coated with Gelfoam and Floseal.  We then placed 2 SpineCraft Narciso pedicle screws on the left at L5-S1, checked them with triggered EMGs, and connected with a kuldeep.  Final tightening performed on every set screw after compression of the disc space.  Final x-ray showed excellent placement of the hardware.  Deep fascia was repaired with #1 Vicryl suture.  An intrathecal morphine and fentanyl block had been performed for postop analgesia.  Subcutaneous tissue closed with 2-0 PDS.  Skin closed with 4-0 Monocryl and Dermabond.  A sterile dry dressing was applied.  The patient tolerated the procedure well, was extubated, and taken to the recovery room with stable blood pressure and pulse.  No complications.  SCDs and TEDs used throughout the case.  SSEP was unchanged during entire procedure.    Dictated By Pedro Romero MD  d: 01/22/2025 17:26:52  t: 01/22/2025 17:40:37  [facility] [id number]  [facility]/

## 2025-01-22 NOTE — ANESTHESIA POSTPROCEDURE EVALUATION
Patient: Oneil Wallace    Procedure Summary       Date: 01/22/25 Room / Location: [facility] MAIN OR  / [facility] MAIN OR    Anesthesia Start: 1346 Anesthesia Stop: 1743    Procedure: Left Lumbar 5 - Sacral 1 minimally invasive transforaminal lumbar interbody fusion, instrumentation, cages, allograft (Left: Spine Lumbar) Diagnosis: (Herniated nucleus pulposus and spondylolisthesis Lumbar 5 - Sacral 1)    Surgeons: Pedro Romero MD Anesthesiologist: Neil Mims MD    Anesthesia Type: general ASA Status: 3            Anesthesia Type: general    Vitals Value Taken Time   /79 01/22/25 1742   Temp 37.8 01/22/25 1743   Pulse 102 01/22/25 1742   Resp 13 01/22/25 1742   SpO2 94 % 01/22/25 1742   Vitals shown include unfiled device data.    [facility] AN Post Evaluation:   Patient Evaluated in PACU  Patient Participation: complete - patient participated  Level of Consciousness: awake  Pain Management: adequate  Airway Patency:patent  Dental exam unchanged from preop  Yes    Nausea/Vomiting: none  Cardiovascular Status: acceptable  Respiratory Status: acceptable  Postoperative Hydration acceptable      Neil Mims MD  1/22/2025 5:43 PM

## 2025-01-23 VITALS
HEIGHT: 73 IN | RESPIRATION RATE: 18 BRPM | TEMPERATURE: 98 F | HEART RATE: 85 BPM | OXYGEN SATURATION: 91 % | BODY MASS INDEX: 41.75 KG/M2 | WEIGHT: 315 LBS | SYSTOLIC BLOOD PRESSURE: 134 MMHG | DIASTOLIC BLOOD PRESSURE: 84 MMHG

## 2025-01-23 PROCEDURE — 97116 GAIT TRAINING THERAPY: CPT

## 2025-01-23 PROCEDURE — 97535 SELF CARE MNGMENT TRAINING: CPT

## 2025-01-23 PROCEDURE — 97165 OT EVAL LOW COMPLEX 30 MIN: CPT

## 2025-01-23 PROCEDURE — 97161 PT EVAL LOW COMPLEX 20 MIN: CPT

## 2025-01-23 RX ORDER — POLYETHYLENE GLYCOL 3350 17 G/17G
17 POWDER, FOR SOLUTION ORAL DAILY PRN
Qty: 30 EACH | Refills: 0 | Status: SHIPPED | COMMUNITY
Start: 2025-01-23

## 2025-01-23 RX ORDER — GABAPENTIN 300 MG/1
600 CAPSULE ORAL 3 TIMES DAILY
Qty: 180 CAPSULE | Refills: 1 | Status: SHIPPED | OUTPATIENT
Start: 2025-01-23

## 2025-01-23 NOTE — PLAN OF CARE
Patient alert and oriented. Dressing in place to lower back, LSO when out of bed. Gel ice. Norco provided as needed for pain. Ambulating standby with walker. Gallardo removed at 1500, now voiding.  Cleared for discharge. Going home, no needs. Discharge instructions provided, all questions answered. All meds ready for pickup at pharmacy.    Problem: Patient Centered Care  Goal: Patient preferences are identified and integrated in the patient's plan of care  Description: Interventions:  - What would you like us to know as we care for you?   - Provide timely, complete, and accurate information to patient/family  - Incorporate patient and family knowledge, values, beliefs, and cultural backgrounds into the planning and delivery of care  - Encourage patient/family to participate in care and decision-making at the level they choose  - Honor patient and family perspectives and choices  Outcome: Adequate for Discharge     Problem: Patient/Family Goals  Goal: Patient/Family Long Term Goal  Description: Patient's Long Term Goal: discharge    Interventions:  - follow md orders  -monitor labs  -discharge planning  -update pt and family on plan of  care  - See additional Care Plan goals for specific interventions  Outcome: Adequate for Discharge  Goal: Patient/Family Short Term Goal  Description: Patient's Short Term Goal: pain management    Interventions:   - follow md orders  -take pain medication as needed  -non-pharmacologic therapy  -PT/OT  - See additional Care Plan goals for specific interventions  Outcome: Adequate for Discharge

## 2025-01-23 NOTE — DISCHARGE SUMMARY
[facility] Internal Medicine Discharge Summary   Patient ID:  Oneil Wallace  [id number]  34 year old  3/4/1990    Admit date: 1/22/2025    Discharge date and time: 1/23/2025     Attending Physician: Pedro Romero MD     Primary Care Physician: Sulma Nuñez MD     Admit Dx: Herniated nucleus pulposus and spondylolisthesis Lumbar 5 - Sacral 1  S/P lumbar fusion      Discharge Diagnosis   Lumbar DJD/ Spinal stenosis w radiculpathy sp Left Lumbar 5 - Sacral 1 minimally invasive transforaminal lumbar interbody fusion, instrumentation, cages, allograft (Left: Spine Lumbar)     Discharged Condition: stable    Disposition: home    Important follow up:  Roxy Chavez, PA  [address] [facility] [address]  [phone]    Go on 2/3/2025  for post op visit    Sulma Nuñez MD  Internal Medicine  [address] [address]  [phone]  Go on 1/29/2025  @4:20pm for your Hospital follow up appointment        Please refer to prior H&P on admission date.    Hospital Course:   34 year old male with PMH sig for lumbar DJD w LE radiculopathy sp Posterolateral fusion, posterior lumbar interbody fusion L5-S     Lumbar DJD/ Spinal stenosis w radiculpathy sp Left Lumbar 5 - Sacral 1 minimally invasive transforaminal lumbar interbody fusion, instrumentation, cages, allograft (Left: Spine Lumbar)   - post op per spine  - PT, OT home w HH PT  - pain control- ivp dilaudid prn > po as able   - reports noted snoring but never tested for geeta- will do cont pulse ox and tele while on iv narcotics  - CBC, BMP reviewed     Seasonal affective disorder  - wellbutrin     GERD  - managed w/o meds     Class 3 obesity BMI 41.82  - weight loss       Day of discharge Exam   Vitals:    01/23/25 1502   BP: 134/84   Pulse:    Resp: 18   Temp: 97.7 °F (36.5 °C)       Exam on day of discharge:  Gen: No acute distress  CV: RRR  Lungs: CTAB, good respiratory effort  Abdomen: s/nt/nd  Neuro: no focal  deficits      Discharge meds     Medication List        START taking these medications      calcitonin (salmon) 200 UNIT/ACT Soln  Commonly known as: Miacalcin  1 spray by Nasal route daily. Alternate nostrils each day.     cholecalciferol 125 MCG (5000 UT) Tabs  Commonly known as: Vitamin D3  Take 1 tablet (5,000 Units total) by mouth daily.     HYDROcodone-acetaminophen  MG Tabs  Commonly known as: Norco  Take 1-2 tablets by mouth every 6 (six) hours as needed for Pain for 3 days, THEN 0.5-1 tablets every 6 (six) hours as needed for Pain. TAKE AS DIRECTED.  NO EARLY REFILLS..  Start taking on: January 22, 2025  Replaces: HYDROcodone-acetaminophen 5-325 MG Tabs     Naloxone HCl 4 MG/0.1ML Liqd  4 mg by Nasal route as needed. If patient remains unresponsive, repeat dose in other nostril 2-5 minutes after first dose.     Polyethylene Glycol 3350 17 g Pack  Commonly known as: MIRALAX  Take 17 g by mouth daily as needed.     vitamin C 1000 MG Tabs  Take 1 tablet (1,000 mg total) by mouth 2 (two) times daily.            CONTINUE taking these medications      buPROPion  MG Tb12  Commonly known as: WELLBUTRIN SR     docusate sodium 100 MG Caps  Commonly known as: COLACE  Take 100 mg by mouth 2 (two) times daily.     gabapentin 300 MG Caps  Commonly known as: Neurontin  Take 2 capsules (600 mg total) by mouth 3 (three) times daily.     Omeprazole 40 MG Cpdr            STOP taking these medications      HYDROcodone-acetaminophen 5-325 MG Tabs  Commonly known as: Norco  Replaced by: HYDROcodone-acetaminophen  MG Tabs            ASK your doctor about these medications      cyclobenzaprine 10 MG Tabs  Commonly known as: Flexeril  Take 1 tablet (10 mg total) by mouth 3 (three) times daily as needed for Muscle spasms.  Ask about: Should I take this medication?               Where to Get Your Medications        These medications were sent to [facility] - [place], [place] - [address] [place] [address] [phone],  [phone]  [address], [place] [address]      Phone: [phone]   calcitonin (salmon) 200 UNIT/ACT Soln  cholecalciferol 125 MCG (5000 UT) Tabs  gabapentin 300 MG Caps  HYDROcodone-acetaminophen  MG Tabs  Naloxone HCl 4 MG/0.1ML Liqd  vitamin C 1000 MG Tabs       Information about where to get these medications is not yet available    Ask your nurse or doctor about these medications  Polyethylene Glycol 3350 17 g Pack         Consults: None    Radiology: XR FLUOROSCOPY C-ARM TIME LESS THAN 1 HOUR (CPT=76000)    Result Date: 1/22/2025  PROCEDURE: XR FLUORO PHYSICIAN TIME< 1 HOUR (CPT=76000)  COMPARISON: [facility], MRI SPINE LUMBAR (CPT=72148), 1/07/2025, 8:39 PM.  [facility], CT SPINE LUMBAR (CPT=72131), 1/09/2025, 12:29 PM.  INDICATIONS: Left Lumbar 5 - Sacral 1 minimally invasive transforaminal lumbar interbody fusion, instrumentation, cages, allograft  TECHNIQUE:   FLUOROSCOPY IMAGES OBTAINED:  3 FLUOROSCOPY TIME:  2 minutes 20.6 seconds RADIATION DOSE (Dose Area Product):  2.85-mGy*m^2  FINDINGS:   Fluoroscopic guidance was utilized for posterior instrumentation and fusion at L5-S1.  Please refer to the operative report for further details.         CONCLUSION: See above    Dictated by (CST): Sumanth Zavala MD on 1/22/2025 at 5:29 PM     Finalized by (CST): Sumanth Zavala MD on 1/22/2025 at 5:30 PM          CT SPINE LUMBAR (CPT=72131)    Result Date: 1/9/2025  PROCEDURE: CT SPINE LUMBAR (CPT=72131)  COMPARISON: [facility], MRI SPINE LUMBAR (CPT=72148), 1/07/2025, 8:39 PM.  INDICATIONS: Lower back pain, left leg pain,  TECHNIQUE:   Multi-planar CT images were obtained without intravenous contrast material.  Automated exposure control for dose reduction was used. Adjustment of the mA and/or kV was done based on the patient's size. Use of iterative reconstruction technique for dose reduction was used.  Dose information is transmitted to the ACR  (American College of Radiology) NRDR (National Radiology Data Registry) which includes the Dose Index Registry.  FINDINGS:  NUMERATION: For the purposes of this examination, the lowest fully formed disc space is designated as L5-S1. ALIGNMENT:   The anatomic lumbar lordosis is preserved. There is grade I anterolisthesis of L5 relative to S1, related to bilateral L5-S1 spondylolysis; slight dextroscoliosis. VERTEBRAL BODIES:   A total of 5 non-rib-bearing lumbar-type vertebral bodies are identified. No fracture, subluxation, or bony lesion is visible. PARASPINAL AREA: Partially imaged lower thoracic spine degenerative changes with a disc bulge at T11-T12 as well as ligamentum flavum redundancy and thickening at T11-T12 and T12-L1. SACROILIAC JOINTS: Unremarkable.   LUMBAR DISC LEVELS: L1-L2: No significant disc/facet abnormality, spinal stenosis, or foraminal stenosis.  L2-L3: No significant disc/facet abnormality, spinal stenosis, or foraminal stenosis.  L3-L4: Mild disc desiccation and degeneration.  No significant spinal canal or neural foraminal stenosis. L4-L5: Diffuse disc bulge with superimposed right paracentral/subarticular zone disc protrusion in addition to minor bilateral facet arthropathy.  Mild right lateral recess stenosis with minor bilateral neural foraminal stenosis, but no spinal canal compromise. L5-S1: Left eccentric disc bulge with left greater than right facet arthropathy.  Severe left and mild-to-moderate right neural foraminal stenosis with no spinal canal compromise.  OTHER: Negative.          CONCLUSION:   1. L5-S1:  Severe left and mild-to-moderate right neural foraminal stenosis. 2. L4-L5:  Mild right lateral recess with minor bilateral neural foraminal stenosis.  3. Grade I anterolisthesis of L5 relative to S1, related to bilateral L5-S1 spondylolysis.  4. Slight dextroscoliosis of the lumbar spine.  5. Minor but advanced for patient age left iliac artery atherosclerosis.   el-remote   Dictated by (CST): Reese Hart MD on 1/09/2025 at 12:56 PM     Finalized by (CST): Reese Hart MD on 1/09/2025 at 1:01 PM          MRI SPINE LUMBAR (CPT=72148)    Result Date: 1/7/2025  PROCEDURE: MRI SPINE LUMBAR (CPT=72148)4  COMPARISON: None.  INDICATIONS: lumbar radiculopathy s/p LESI  TECHNIQUE: A variety of imaging planes and parameters were utilized for visualization of suspected pathology.  FINDINGS: Evaluation is slightly degraded by patient-related motion artifact.    PARASPINAL AREA: Normal with no visible mass.  BONES: T12 vertebral hemangioma.  No suspicious bone lesion or acute fracture.  Vertebral bodies are intact. CORD/CAUDA EQUINA: Normal caliber, contour, and signal intensity.  OTHER: Mild degenerative disc disease at T11-12 and T12-L1 without significant stenosis.  LUMBAR DISC LEVELS: L1-L2: No significant disc/facet abnormality, spinal stenosis, or foraminal stenosis.  L2-L3: No significant disc/facet abnormality, spinal stenosis, or foraminal stenosis.  L3-L4: No significant disc/facet abnormality, spinal stenosis, or foraminal stenosis.  L4-L5: Minimal retrolisthesis.  Decrease in disc height with a spondylotic disc bulging and a superimposed right paracentral disc protrusion exaggerated by the retrolisthesis.  Mild peripheral narrowing.  No significant central narrowing or high-grade peripheral narrowing. L5-S1: Bilateral L5 pars defects with grade 1 spondylolisthesis of L5 on S1.  Disc degeneration with a left foraminal disc extrusion which results in severe foraminal narrowing and impingement on the left L5 nerve root.  Mild right foraminal narrowing.  Prominence of epidural fat attenuates the caudal thecal sac.         CONCLUSION:  1. L5-S1:  Grade 1 spondylolisthesis related to bilateral L5 pars defects.  Disc degeneration with large left foraminal disc herniation impinging on the left L5 nerve root.  Mild right foraminal narrowing. 2. L4-5:  Minimal retrolisthesis.  Disc  degeneration with broad-based right paracentral/central disc protrusion.  Mild peripheral narrowing.  No high-grade stenosis.    Dictated by (CST): Andrei Rosales MD on 1/07/2025 at 9:45 PM     Finalized by (CST): Andrei Rosales MD on 1/07/2025 at 9:53 PM             Operative Procedures: Procedure(s) (LRB):  Left Lumbar 5 - Sacral 1 minimally invasive transforaminal lumbar interbody fusion, instrumentation, cages, allograft (Left)       Total Time Coordinating Care: < than 30 minutes    Patient had opportunity to ask questions and state understand and agree with therapeutic plan as outlined      Rosey Busch MD  [facility]  [phone]  1/23/2025  10:57 AM

## 2025-01-23 NOTE — PHYSICAL THERAPY NOTE
PHYSICAL THERAPY EVALUATION - INPATIENT     Room Number: 406/406-A  Evaluation Date: 1/23/2025  Type of Evaluation: Initial   Physician Order: PT Eval and Treat    Presenting Problem: Herniated nucleus pulposus and spondylolisthesis L5-S1, s/p Left Lumbar 5 - Sacral 1 minimally invasive transforaminal lumbar interbody fusion, instrumentation, cages, allograft     Reason for Therapy: Mobility Dysfunction and Discharge Planning    PHYSICAL THERAPY ASSESSMENT   Patient is a 34 year old male admitted 1/22/2025 for Herniated nucleus pulposus and spondylolisthesis L5-S1, s/p Left Lumbar 5 - Sacral 1 minimally invasive transforaminal lumbar interbody fusion, instrumentation, cages, allograft. Prior to admission, patient's baseline is Mod I for functional mobility - holding onto furniture within the house, utilizing a rolling walker for community ambulation. Patient is currently functioning below baseline with bed mobility, transfers, gait, stair negotiation, standing prolonged periods, and performing household tasks. Patient is requiring supervision and contact guard assist as a result of the following impairments: decreased functional strength, decreased endurance/aerobic capacity, pain, impaired dynamic standing balance, decreased muscular endurance, and limited lumbar spine ROM. Based on patient and family demonstrating good understanding of post operative protocol and safe functional mobility observed this date, no further skilled IP PT required at this time. RN made aware. Patient and family agreeable. Safe for DC to home from a PT standpoint.     PLAN  Patient has been evaluated and presents with no skilled Physical Therapy needs at this time.  Patient will be discharged from Physical Therapy services. Please re-order if a new functional limitation presents during this admission.    PT Device Recommendation: Rolling walker    PHYSICAL THERAPY MEDICAL/SOCIAL HISTORY     Problem List  Principal Problem:    S/P lumbar  spinal fusion  Active Problems:    S/P lumbar fusion    HOME SITUATION  Type of Home: House  Home Layout: Multi-level  Stairs to Enter : 11 (5)   Railing: Yes    Stairs to Bedroom: 6    Railing: Yes    Lives With: Spouse (children)    Drives: Yes   Patient Regularly Uses: Rolling walker (for community ambulation, owns cane)      SUBJECTIVE  Agreeable     PHYSICAL THERAPY EXAMINATION   OBJECTIVE  Precautions: Lumbar brace;Spine (LSO when out of bed)  Fall Risk: Standard fall risk    PAIN ASSESSMENT  Ratin  Location: incision site  Management Techniques: Activity promotion;Body mechanics;Repositioning    COGNITION  Overall Cognitive Status:  WFL - within functional limits    RANGE OF MOTION AND STRENGTH ASSESSMENT  Upper extremity ROM and strength are within functional limits   Lower extremity ROM is within functional limits   Lower extremity strength is within functional limits     BALANCE  Static Sitting: Good  Dynamic Sitting: Fair +  Static Standing: Fair  Dynamic Standing: Fair -    NEUROLOGICAL FINDINGS  Sensation: L foot numbness/tingling - improving    AM-PAC '6-Clicks' INPATIENT SHORT FORM - BASIC MOBILITY  How much difficulty does the patient currently have...  Patient Difficulty: Turning over in bed (including adjusting bedclothes, sheets and blankets)?: A Little   Patient Difficulty: Sitting down on and standing up from a chair with arms (e.g., wheelchair, bedside commode, etc.): A Little   Patient Difficulty: Moving from lying on back to sitting on the side of the bed?: A Little   How much help from another person does the patient currently need...   Help from Another: Moving to and from a bed to a chair (including a wheelchair)?: A Little   Help from Another: Need to walk in hospital room?: A Little   Help from Another: Climbing 3-5 steps with a railing?: A Little     AM-PAC Score:  Raw Score: 18   Approx Degree of Impairment: 46.58%   Standardized Score (AM-PAC Scale): 43.63   CMS Modifier (G-Code):  CK    FUNCTIONAL ABILITY STATUS  Functional Mobility/Gait Assessment  Gait Assistance:  (SBA>Supervision)  Distance (ft): 400 ft  Assistive Device: Rolling walker  Pattern: Shuffle (decreased kyra speed, decreased step length)  Stairs: Stairs  How Many Stairs: 6  Device: 1 Rail  Assist: Contact guard assist  Pattern: Ascend and Descend  Ascend and Descend : Step to  Rolling: supervision via log roll technique  Supine to Sit: supervision via log roll technique; cues provided for appropriate sequencing  Sit to Stand: supervision from EOB and bedside chair    Exercise/Education Provided:  Bed mobility  Body mechanics  Energy conservation  Functional activity tolerated  Gait training  Posture  Transfer training  Stair negotiation    Skilled Therapy Provided: Patient with spinal precautions and orders for LSO to be worn when out of bed. Patient's current functional deficits include impaired bed mobility, transfers, gait, stair negotiation, balance, tolerance for activity, which are below the patient's pre-admission status. Patient in bed with spouse present in room upon arrival. RN approved activity. Educated patient on POC and benefits of mobilization. Agreeable to participate. Patient reporting incision site pain, rated 5 out of 10 per the pain scale. Educated patient on post-op spinal precautions, log rolling, progressive mobility, and frequent position changes. Provided 'Back on Track' handout for reference. Patient with good carryover throughout. Patient benefits from increased time in order to complete functional mobility tasks. Patient and wife deny any functional mobility concerns at this time regarding return home.     The patient's Approx Degree of Impairment: 46.58% has been calculated based on documentation in the [facility] '6 clicks' Inpatient Basic Mobility Short Form.  Research supports that patients with this level of impairment may benefit from OPPT when medically cleared.  Final disposition will be made  by interdisciplinary medical team.    Patient End of Session: Needs met;Call light within reach;Bracing education provided per handout;RN aware of session/findings;Up in chair;All patient questions and concerns addressed;Hospital anti-slip socks;Family present    Patient was able to achieve the following ...   Patient able to transfer   Supervision, safe for home    Patient able to ambulate on level surfaces   Supervision with RW, safe for home     Patient Evaluation Complexity Level:  History Low - no personal factors and/or co-morbidities   Examination of body systems Low -  addressing 1-2 elements   Clinical Presentation Low- Stable   Clinical Decision Making  Low Complexity     Gait Trainin minutes

## 2025-01-23 NOTE — PROGRESS NOTES
[facility]  Progress Note    Oneil Wallace Patient Status:  Inpatient    3/4/1990 MRN [id number]   Location [facility] 4W/SW/SE Attending Pedro Romero MD   Hosp Day # 1 PCP Sulma Nuñez MD     Subjective:  Oneil Wallace is a(n) 34 year old male.  C/o low back pain with movement, denies left leg pain present prior to surgery.  No nausea.    Objective/Physical Exam:  General: Alert, orientated x3.  Cooperative.  No apparent distress.    Vital Signs:  Blood pressure 104/79, pulse 85, temperature 98 °F (36.7 °C), temperature source Temporal, resp. rate 18, height 6' 1\" (1.854 m), weight (!) 317 lb (143.8 kg), SpO2 93%.    Pain Assessment  Pain Assessment Tool: 0-10  Pasero Sedation Scale: Sleep, easy to arouse  0-10 Scale: 8 (severe pain)  Pain Type: Chronic pain  Pain Location: Back  Pain Descriptors: Aching  Pain Intervention(s): Medication    Extremities:  No lower extremity edema noted.  Calves non-tender bilaterally.  4+/5 left EHL/DF, 5/5 left PF, knee ext, hip flex  5/5 right LE strength  Sensation intact to bilat LE    Assessment/Plan:  POD 1 s/p left L5-S1 minimally invasive lami/fusion with instrumentation    Mechanical DVT prophylaxis (LILIA/ SCDs)  Mobilize patient, PT/OT advance as tolerated.  Brace on when out of bed.  Gallardo removal at 1500  Increase oral pain meds as intrathecal block wears off  Ok to d/c home today when/if medically cleared, ambulating, urinating, and pain controlled on po meds.      KRISTYN Marie  Spine Surgery, [facility]  [phone]  2025  7:47 AM

## 2025-01-24 NOTE — OCCUPATIONAL THERAPY NOTE
OCCUPATIONAL THERAPY EVALUATION - INPATIENT     Room Number: 406/406-A  Evaluation Date: 2025  Type of Evaluation: Initial  Presenting Problem: L5-S1 fusion    Physician Order: IP Consult to Occupational Therapy  Reason for Therapy: ADL/IADL Dysfunction and Discharge Planning    OCCUPATIONAL THERAPY ASSESSMENT   Patient is a 34 year old male admitted 2025.  Prior to admission, patient's baseline is mod I.  Patient is currently functioning near baseline with ADLs.    PLAN  Patient has been evaluated and presents with no skilled Occupational Therapy needs  at this time.  Patient will be discharged from Occupational Therapy services. Please re-order if a new functional limitation presents during this admission.         OCCUPATIONAL THERAPY MEDICAL/SOCIAL HISTORY   Problem List  Principal Problem:    S/P lumbar spinal fusion  Active Problems:    S/P lumbar fusion    HOME SITUATION  Type of Home: House  Home Layout: Multi-level  Lives With: Spouse (children)  Toilet and Equipment: Comfort height toilet  Shower/Tub and Equipment: Tub-shower combo  Other Equipment: Reacher  Occupation/Status:   Drives: Yes  Patient Regularly Uses: Rolling walker (for community ambulation, owns cane)      Prior Level of Drifting: mod I-I with ADLs, slowly declining with independence past month with increased back pain    SUBJECTIVE  \"This pain is a lot better than it was the 2 weeks prior to surgery\"    OCCUPATIONAL THERAPY EXAMINATION    OBJECTIVE  Precautions: Lumbar brace; Spine (LSO when out of bed)  Fall Risk: Standard fall risk       PAIN ASSESSMENT  Ratin  Location: back  Management Techniques: Relaxation; Repositioning      COGNITION  Overall Cognitive Status:  WFL - within functional limits    VISION  WFL    PERCEPTION  WFL    SENSATION  L foot tingly    Communication: WFL    Behavioral/Emotional/Social: WFL    RANGE OF MOTION   Upper extremity ROM is within functional limits     STRENGTH ASSESSMENT  Upper  extremity strength NT with back prec    COORDINATION  Gross Motor: WFL   Fine Motor: WFL     ACTIVITIES OF DAILY LIVING ASSESSMENT  AM-PAC ‘6-Clicks’ Inpatient Daily Activity Short Form  How much help from another person does the patient currently need…  -   Putting on and taking off regular lower body clothing?: A Lot  -   Bathing (including washing, rinsing, drying)?: A Little  -   Toileting, which includes using toilet, bedpan or urinal? : Total  -   Putting on and taking off regular upper body clothing?: None  -   Taking care of personal grooming such as brushing teeth?: None  -   Eating meals?: None    AM-PAC Score:  Score: 18  Approx Degree of Impairment: 46.65%  Standardized Score (AM-PAC Scale): 38.66  CMS Modifier (G-Code): CK    FUNCTIONAL TRANSFER ASSESSMENT  Sit to Stand: Edge of Bed  Edge of Bed: Stand-by Assist  Toilet Transfer: Supervision (grab bars, cues to reinforce technique)  Functional mobility activity SBA-SPV FWW    BED MOBILITY: SBA cues for log roll techs     BALANCE ASSESSMENT  Static Sitting: Modified Independent  Static Standing: Supervision    FUNCTIONAL ADL ASSESSMENT  Eating: Independent  Grooming Standing: Supervision  UB Dressing Seated: Modified Independent (donning LSO brace at EOB)  LB Dressing Seated: Maximum Assist (socks)  Toileting Seated: Dependent (talbert)       Skilled Therapy Provided: RN approved session. Received patient in supine. Performed ADLs/functional mobility/transfers as stated above. At the end of session, patient left in chair with needs met, and RN aware of session.    EDUCATION PROVIDED  Patient Education : Role of Occupational Therapy; Plan of Care; Discharge Recommendations; Functional Transfer Techniques; Surgical Precautions; Posture/Positioning; Proper Body Mechanics; Bracing Education Provided (ADL training)  Patient's Response to Education: Verbalized Understanding; Returned Demonstration  Family/Caregiver Education : -- (same as pt)    The patient's  Approx Degree of Impairment: 46.65% has been calculated based on documentation in the [facility] '6 clicks' Inpatient Daily Activity Short Form.  Research supports that patients with this level of impairment may benefit from HHOT however pt funcitoning near baseline, likely will need intermittent assist, no skilled needs at NC.  Final disposition will be made by interdisciplinary medical team.    Patient End of Session: Up in chair;Needs met;Call light within reach;All patient questions and concerns addressed;RN aware of session/findings;Family present    Patient was able to achieve the following ...   Patient able to toilet transfer  safely and SPV/SBA/independently    Patient able to dress lower extremities  safely and requiring assist, reported wife can assist at home    Patient/Caregiver able to demonstrate safety with ADLS  safely and SPV, assist with LB dressing     Patient Evaluation Complexity Level:   Occupational Profile/Medical History LOW - Brief history including review of medical or therapy records    Specific performance deficits impacting engagement in ADL/IADL LOW  1 - 3 performance deficits    Client Assessment/Performance Deficits LOW - No comorbidities nor modifications of tasks    Clinical Decision Making LOW - Analysis of occupational profile, problem-focused assessments, limited treatment options    Overall Complexity LOW     OT Session Time: 26 minutes  Self-Care Home Management: 8 minutes  Therapeutic Activity: 8 minutes  10 min hernesto Simon OTR/L

## 2025-01-24 NOTE — PAYOR COMM NOTE
--------------  DISCHARGE REVIEW    Payor: [facility]  Subscriber #:  [id number]  Authorization Number: [id number]    Admit date: 1/22/25  Admit time:  11:27 AM  Discharge Date: 1/23/2025  4:34 PM     Admitting Physician: Pedro Romero MD  Attending Physician:  No att. providers found  Primary Care Physician: Sulma Nuñez MD          Discharge Summary Notes        Discharge Summary signed by Rosey Busch MD at 1/23/2025  6:39 PM       Author: Rosey Busch MD Specialty: HOSPITALIST, Internal Medicine Author Type: Physician    Filed: 1/23/2025  6:39 PM Date of Service: 1/23/2025 10:57 AM Status: Signed    : Rosey Busch MD (Physician)           [facility] [place] [facility] [place]   Patient ID:  Oneil Wallace  [id number]  34 year old  3/4/1990    Admit date: 1/22/2025    Discharge date and time: 1/23/2025     Attending Physician: Pedro Romero MD     Primary Care Physician: Sulma Nuñez MD     Admit Dx: Herniated nucleus pulposus and spondylolisthesis Lumbar 5 - Sacral 1  S/P lumbar fusion      Discharge Diagnosis   Lumbar DJD/ Spinal stenosis w radiculpathy sp Left Lumbar 5 - Sacral 1 minimally invasive transforaminal lumbar interbody fusion, instrumentation, cages, allograft (Left: Spine Lumbar)     Discharged Condition: stable    Disposition: home    Important follow up:  Roxy Chavez PA  [address] [facility] [address]  [phone]    Go on 2/3/2025  for post op visit    Sulma Nuñez MD  Internal Medicine  [address] [id number] [phone]  Go on 1/29/2025  @4:20pm for your Hospital follow up appointment        Please refer to prior H&P on admission date.    Hospital Course:   34 year old male with PMH sig for lumbar DJD w LE radiculopathy sp Posterolateral fusion, posterior lumbar interbody fusion L5-S     Lumbar DJD/ Spinal stenosis w radiculpathy sp Left Lumbar 5 - Sacral 1 minimally invasive transforaminal lumbar  interbody fusion, instrumentation, cages, allograft (Left: Spine Lumbar)   - post op per spine  - PT, OT home w HH PT  - pain control- ivp dilaudid prn > po as able   - reports noted snoring but never tested for geeta- will do cont pulse ox and tele while on iv narcotics  - CBC, BMP reviewed     Seasonal affective disorder  - wellbutrin     GERD  - managed w/o meds     Class 3 obesity BMI 41.82  - weight loss       Day of discharge Exam   Vitals:    01/23/25 1502   BP: 134/84   Pulse:    Resp: 18   Temp: 97.7 °F (36.5 °C)       Exam on day of discharge:  Gen: No acute distress  CV: RRR  Lungs: CTAB, good respiratory effort  Abdomen: s/nt/nd  Neuro: no focal deficits      Discharge meds     Medication List        START taking these medications      calcitonin (salmon) 200 UNIT/ACT Soln  Commonly known as: Miacalcin  1 spray by Nasal route daily. Alternate nostrils each day.     cholecalciferol 125 MCG (5000 UT) Tabs  Commonly known as: Vitamin D3  Take 1 tablet (5,000 Units total) by mouth daily.     HYDROcodone-acetaminophen  MG Tabs  Commonly known as: Norco  Take 1-2 tablets by mouth every 6 (six) hours as needed for Pain for 3 days, THEN 0.5-1 tablets every 6 (six) hours as needed for Pain. TAKE AS DIRECTED.  NO EARLY REFILLS..  Start taking on: January 22, 2025  Replaces: HYDROcodone-acetaminophen 5-325 MG Tabs     Naloxone HCl 4 MG/0.1ML Liqd  4 mg by Nasal route as needed. If patient remains unresponsive, repeat dose in other nostril 2-5 minutes after first dose.     Polyethylene Glycol 3350 17 g Pack  Commonly known as: MIRALAX  Take 17 g by mouth daily as needed.     vitamin C 1000 MG Tabs  Take 1 tablet (1,000 mg total) by mouth 2 (two) times daily.            CONTINUE taking these medications      buPROPion  MG Tb12  Commonly known as: WELLBUTRIN SR     docusate sodium 100 MG Caps  Commonly known as: COLACE  Take 100 mg by mouth 2 (two) times daily.     gabapentin 300 MG Caps  Commonly known as:  Neurontin  Take 2 capsules (600 mg total) by mouth 3 (three) times daily.     Omeprazole 40 MG Cpdr            STOP taking these medications      HYDROcodone-acetaminophen 5-325 MG Tabs  Commonly known as: Norco  Replaced by: HYDROcodone-acetaminophen  MG Tabs            ASK your doctor about these medications      cyclobenzaprine 10 MG Tabs  Commonly known as: Flexeril  Take 1 tablet (10 mg total) by mouth 3 (three) times daily as needed for Muscle spasms.  Ask about: Should I take this medication?               Where to Get Your Medications        These medications were sent to [facility] [facility], [facility] - [place], [place] - [address] [facility] [phone], [phone]  [address], [place] [address]      Phone: [phone]   calcitonin (salmon) 200 UNIT/ACT Soln  cholecalciferol 125 MCG (5000 UT) Tabs  gabapentin 300 MG Caps  HYDROcodone-acetaminophen  MG Tabs  Naloxone HCl 4 MG/0.1ML Liqd  vitamin C 1000 MG Tabs       Information about where to get these medications is not yet available    Ask your nurse or doctor about these medications  Polyethylene Glycol 3350 17 g Pack         Consults: None    Radiology: XR FLUOROSCOPY C-ARM TIME LESS THAN 1 HOUR (CPT=76000)    Result Date: 1/22/2025  PROCEDURE: XR FLUORO PHYSICIAN TIME< 1 HOUR (CPT=76000)  COMPARISON: [facility], MRI SPINE LUMBAR (CPT=72148), 1/07/2025, 8:39 PM.  [facility], CT SPINE LUMBAR (CPT=72131), 1/09/2025, 12:29 PM.  INDICATIONS: Left Lumbar 5 - Sacral 1 minimally invasive transforaminal lumbar interbody fusion, instrumentation, cages, allograft  TECHNIQUE:   FLUOROSCOPY IMAGES OBTAINED:  3 FLUOROSCOPY TIME:  2 minutes 20.6 seconds RADIATION DOSE (Dose Area Product):  2.85-mGy*m^2  FINDINGS:   Fluoroscopic guidance was utilized for posterior instrumentation and fusion at L5-S1.  Please refer to the operative report for further details.         CONCLUSION: See above    Dictated by (CST): Sumanth Zavala MD on  1/22/2025 at 5:29 PM     Finalized by (CST): Sumanth Zavala MD on 1/22/2025 at 5:30 PM          CT SPINE LUMBAR (CPT=72131)    Result Date: 1/9/2025  PROCEDURE: CT SPINE LUMBAR (CPT=72131)  COMPARISON: [facility], MRI SPINE LUMBAR (CPT=72148), 1/07/2025, 8:39 PM.  INDICATIONS: Lower back pain, left leg pain,  TECHNIQUE:   Multi-planar CT images were obtained without intravenous contrast material.  Automated exposure control for dose reduction was used. Adjustment of the mA and/or kV was done based on the patient's size. Use of iterative reconstruction technique for dose reduction was used.  Dose information is transmitted to the ACR (American College of Radiology) NRDR (National Radiology Data Registry) which includes the Dose Index Registry.  FINDINGS:  NUMERATION: For the purposes of this examination, the lowest fully formed disc space is designated as L5-S1. ALIGNMENT:   The anatomic lumbar lordosis is preserved. There is grade I anterolisthesis of L5 relative to S1, related to bilateral L5-S1 spondylolysis; slight dextroscoliosis. VERTEBRAL BODIES:   A total of 5 non-rib-bearing lumbar-type vertebral bodies are identified. No fracture, subluxation, or bony lesion is visible. PARASPINAL AREA: Partially imaged lower thoracic spine degenerative changes with a disc bulge at T11-T12 as well as ligamentum flavum redundancy and thickening at T11-T12 and T12-L1. SACROILIAC JOINTS: Unremarkable.   LUMBAR DISC LEVELS: L1-L2: No significant disc/facet abnormality, spinal stenosis, or foraminal stenosis.  L2-L3: No significant disc/facet abnormality, spinal stenosis, or foraminal stenosis.  L3-L4: Mild disc desiccation and degeneration.  No significant spinal canal or neural foraminal stenosis. L4-L5: Diffuse disc bulge with superimposed right paracentral/subarticular zone disc protrusion in addition to minor bilateral facet arthropathy.  Mild right lateral recess stenosis with minor bilateral neural  foraminal stenosis, but no spinal canal compromise. L5-S1: Left eccentric disc bulge with left greater than right facet arthropathy.  Severe left and mild-to-moderate right neural foraminal stenosis with no spinal canal compromise.  OTHER: Negative.          CONCLUSION:   1. L5-S1:  Severe left and mild-to-moderate right neural foraminal stenosis. 2. L4-L5:  Mild right lateral recess with minor bilateral neural foraminal stenosis.  3. Grade I anterolisthesis of L5 relative to S1, related to bilateral L5-S1 spondylolysis.  4. Slight dextroscoliosis of the lumbar spine.  5. Minor but advanced for patient age left iliac artery atherosclerosis.   elm-remote  Dictated by (CST): Reese Hart MD on 1/09/2025 at 12:56 PM     Finalized by (CST): Reese Hart MD on 1/09/2025 at 1:01 PM          MRI SPINE LUMBAR (CPT=72148)    Result Date: 1/7/2025  PROCEDURE: MRI SPINE LUMBAR (CPT=72148)4  COMPARISON: None.  INDICATIONS: lumbar radiculopathy s/p LESI  TECHNIQUE: A variety of imaging planes and parameters were utilized for visualization of suspected pathology.  FINDINGS: Evaluation is slightly degraded by patient-related motion artifact.    PARASPINAL AREA: Normal with no visible mass.  BONES: T12 vertebral hemangioma.  No suspicious bone lesion or acute fracture.  Vertebral bodies are intact. CORD/CAUDA EQUINA: Normal caliber, contour, and signal intensity.  OTHER: Mild degenerative disc disease at T11-12 and T12-L1 without significant stenosis.  LUMBAR DISC LEVELS: L1-L2: No significant disc/facet abnormality, spinal stenosis, or foraminal stenosis.  L2-L3: No significant disc/facet abnormality, spinal stenosis, or foraminal stenosis.  L3-L4: No significant disc/facet abnormality, spinal stenosis, or foraminal stenosis.  L4-L5: Minimal retrolisthesis.  Decrease in disc height with a spondylotic disc bulging and a superimposed right paracentral disc protrusion exaggerated by the retrolisthesis.  Mild peripheral  narrowing.  No significant central narrowing or high-grade peripheral narrowing. L5-S1: Bilateral L5 pars defects with grade 1 spondylolisthesis of L5 on S1.  Disc degeneration with a left foraminal disc extrusion which results in severe foraminal narrowing and impingement on the left L5 nerve root.  Mild right foraminal narrowing.  Prominence of epidural fat attenuates the caudal thecal sac.         CONCLUSION:  1. L5-S1:  Grade 1 spondylolisthesis related to bilateral L5 pars defects.  Disc degeneration with large left foraminal disc herniation impinging on the left L5 nerve root.  Mild right foraminal narrowing. 2. L4-5:  Minimal retrolisthesis.  Disc degeneration with broad-based right paracentral/central disc protrusion.  Mild peripheral narrowing.  No high-grade stenosis.    Dictated by (CST): Andrei Rosales MD on 1/07/2025 at 9:45 PM     Finalized by (CST): Andrei Rosales MD on 1/07/2025 at 9:53 PM             Operative Procedures: Procedure(s) (LRB):  Left Lumbar 5 - Sacral 1 minimally invasive transforaminal lumbar interbody fusion, instrumentation, cages, allograft (Left)       Total Time Coordinating Care: < than 30 minutes    Patient had opportunity to ask questions and state understand and agree with therapeutic plan as outlined      Rosey Busch MD  [facility]  [phone]  1/23/2025  10:57 AM    Electronically signed by Rosey Busch MD on 1/23/2025  6:39 PM         REVIEWER COMMENTS

## (undated) DEVICE — COVER,TABLE,60X90,STERILE: Brand: MEDLINE

## (undated) DEVICE — Device

## (undated) DEVICE — SYRINGE MED 10ML LL TIP W/O SFTY DISP

## (undated) DEVICE — 3.0MM PRECISION NEURO (MATCH HEAD)

## (undated) DEVICE — SHEET,DRAPE,53X77,STERILE: Brand: MEDLINE

## (undated) DEVICE — GAMMEX® PI HYBRID SIZE 7, STERILE POWDER-FREE SURGICAL GLOVE, POLYISOPRENE AND NEOPRENE BLEND: Brand: GAMMEX

## (undated) DEVICE — NEEDLE CONFIDENCE SPINAL

## (undated) DEVICE — K-WIRE W THREADED END SPNCRFT

## (undated) DEVICE — SYRINGE MED 3ML STD CLR PLAS LL TIP N CTRL

## (undated) DEVICE — KIT HEMSTAT MTRX 8ML PORCINE GEL HUM THROM

## (undated) DEVICE — MONITORING NEUROPHYSIOLOGICAL

## (undated) DEVICE — MICRO KOVER: Brand: UNBRANDED

## (undated) DEVICE — DRAPE,UTILITY,TAPE,15X26,STERILE: Brand: MEDLINE

## (undated) DEVICE — 1 ML INSULIN SYRINGE REGULAR LUER TIP: Brand: MONOJECT

## (undated) DEVICE — GAMMEX® PI HYBRID SIZE 9, STERILE POWDER-FREE SURGICAL GLOVE, POLYISOPRENE AND NEOPRENE BLEND: Brand: GAMMEX

## (undated) DEVICE — GAUZE,SPONGE,FLUFF,6"X6.75",STRL,5/TRAY: Brand: MEDLINE

## (undated) DEVICE — PACK CDS LAMINECTOMY

## (undated) DEVICE — SUT MCRYL 2-0 36IN CT-1 ABSRB UD L36MM TAPR P

## (undated) DEVICE — CONTAINER,SPECIMEN,OR STERILE,4OZ: Brand: MEDLINE

## (undated) DEVICE — ENCORE® LATEX MICRO SIZE 6.5, STERILE LATEX POWDER-FREE SURGICAL GLOVE: Brand: ENCORE

## (undated) DEVICE — C-ARMOR C-ARM EQUIPMENT COVERS CLEAR STERILE UNIVERSAL FIT 12 PER CASE: Brand: C-ARMOR

## (undated) DEVICE — TRAY CATH FOLEY 16FR INCLUDE BARDX IC COMPLT CARE

## (undated) DEVICE — WRAP COOLING BACK W/NO PILLOW

## (undated) DEVICE — BLADE ELECTRODE: Brand: EDGE

## (undated) DEVICE — SUT VCRL + 1 27IN ABSRB UD OS-6 L36MM

## (undated) DEVICE — 3 ML SYRINGE LUER-LOCK TIP: Brand: MONOJECT

## (undated) DEVICE — SODIUM CHL 0.9% IRRIG.

## (undated) DEVICE — SUT MCRYL 4-0 18IN PS-2 ABSRB UD 19MM 3/8 CIR

## (undated) DEVICE — SPONGE,LAP,4"X18",XR,ST,5/PK,40PK/CS: Brand: MEDLINE INDUSTRIES, INC.

## (undated) DEVICE — Device: Brand: JELCO

## (undated) DEVICE — PAD,NON-ADHERENT,3X8,STERILE,LF,1/PK: Brand: MEDLINE

## (undated) DEVICE — NEEDLE SPNL 20GA L3.5IN YEL QNCKE STYL DISP

## (undated) DEVICE — NEEDLE BLNT 18GA L1.5IN FILL DISP PRECISGLDE

## (undated) DEVICE — SNAP KOVER: Brand: UNBRANDED

## (undated) DEVICE — ADHESIVE SKIN TOP FOR WND CLSR DERMBND ADV